# Patient Record
Sex: MALE | Race: WHITE | NOT HISPANIC OR LATINO | Employment: OTHER | URBAN - METROPOLITAN AREA
[De-identification: names, ages, dates, MRNs, and addresses within clinical notes are randomized per-mention and may not be internally consistent; named-entity substitution may affect disease eponyms.]

---

## 2017-03-03 ENCOUNTER — HISTORICAL (OUTPATIENT)
Dept: ADMINISTRATIVE | Facility: HOSPITAL | Age: 62
End: 2017-03-03

## 2018-03-06 ENCOUNTER — HISTORICAL (OUTPATIENT)
Dept: LAB | Facility: HOSPITAL | Age: 63
End: 2018-03-06

## 2018-03-06 LAB
ABS NEUT (OLG): 6.79 X10(3)/MCL (ref 2.1–9.2)
ALBUMIN SERPL-MCNC: 4.3 GM/DL (ref 3.4–5)
ALBUMIN/GLOB SERPL: 1.4 RATIO (ref 1.1–2)
ALP SERPL-CCNC: 83 UNIT/L (ref 50–136)
ALT SERPL-CCNC: 24 UNIT/L (ref 12–78)
APPEARANCE, UA: CLEAR
AST SERPL-CCNC: 18 UNIT/L (ref 15–37)
BACTERIA SPEC CULT: ABNORMAL /HPF
BASOPHILS # BLD AUTO: 0.1 X10(3)/MCL (ref 0–0.2)
BASOPHILS NFR BLD AUTO: 1 %
BILIRUB SERPL-MCNC: 1.1 MG/DL (ref 0.2–1)
BILIRUB UR QL STRIP: NEGATIVE
BILIRUBIN DIRECT+TOT PNL SERPL-MCNC: 0.1 MG/DL (ref 0–0.5)
BILIRUBIN DIRECT+TOT PNL SERPL-MCNC: 1 MG/DL (ref 0–0.8)
BUN SERPL-MCNC: 17 MG/DL (ref 7–18)
CALCIUM SERPL-MCNC: 9.4 MG/DL (ref 8.5–10.1)
CHLORIDE SERPL-SCNC: 100 MMOL/L (ref 98–107)
CHOLEST SERPL-MCNC: 224 MG/DL (ref 0–200)
CHOLEST/HDLC SERPL: 5.3 {RATIO} (ref 0–5)
CO2 SERPL-SCNC: 32 MMOL/L (ref 21–32)
COLOR UR: YELLOW
CREAT SERPL-MCNC: 0.78 MG/DL (ref 0.7–1.3)
EOSINOPHIL # BLD AUTO: 0.2 X10(3)/MCL (ref 0–0.9)
EOSINOPHIL NFR BLD AUTO: 2 %
ERYTHROCYTE [DISTWIDTH] IN BLOOD BY AUTOMATED COUNT: 12.8 % (ref 11.5–17)
EST. AVERAGE GLUCOSE BLD GHB EST-MCNC: 120 MG/DL
GLOBULIN SER-MCNC: 3.1 GM/DL (ref 2.4–3.5)
GLUCOSE (UA): NEGATIVE
GLUCOSE SERPL-MCNC: 101 MG/DL (ref 74–106)
HBA1C MFR BLD: 5.8 % (ref 4.2–6.3)
HCT VFR BLD AUTO: 44.1 % (ref 42–52)
HDLC SERPL-MCNC: 42 MG/DL (ref 35–60)
HGB BLD-MCNC: 14.8 GM/DL (ref 14–18)
HGB UR QL STRIP: NEGATIVE
KETONES UR QL STRIP: NEGATIVE
LDLC SERPL CALC-MCNC: 152 MG/DL (ref 0–129)
LEUKOCYTE ESTERASE UR QL STRIP: NEGATIVE
LYMPHOCYTES # BLD AUTO: 1.6 X10(3)/MCL (ref 0.6–4.6)
LYMPHOCYTES NFR BLD AUTO: 18 %
MCH RBC QN AUTO: 30.5 PG (ref 27–31)
MCHC RBC AUTO-ENTMCNC: 33.6 GM/DL (ref 33–36)
MCV RBC AUTO: 90.7 FL (ref 80–94)
MONOCYTES # BLD AUTO: 0.6 X10(3)/MCL (ref 0.1–1.3)
MONOCYTES NFR BLD AUTO: 6 %
NEUTROPHILS # BLD AUTO: 6.79 X10(3)/MCL (ref 2.1–9.2)
NEUTROPHILS NFR BLD AUTO: 73 %
NITRITE UR QL STRIP: NEGATIVE
PH UR STRIP: 6.5 [PH] (ref 5–9)
PLATELET # BLD AUTO: 297 X10(3)/MCL (ref 130–400)
PMV BLD AUTO: 11.5 FL (ref 9.4–12.4)
POTASSIUM SERPL-SCNC: 3.9 MMOL/L (ref 3.5–5.1)
PROT SERPL-MCNC: 7.4 GM/DL (ref 6.4–8.2)
PROT UR QL STRIP: ABNORMAL
RBC # BLD AUTO: 4.86 X10(6)/MCL (ref 4.7–6.1)
RBC #/AREA URNS HPF: 0 /HPF (ref 0–2)
SODIUM SERPL-SCNC: 137 MMOL/L (ref 136–145)
SP GR UR STRIP: 1.02 (ref 1–1.03)
SQUAMOUS EPITHELIAL, UA: 8 /HPF (ref 0–4)
TRIGL SERPL-MCNC: 150 MG/DL (ref 30–150)
UA WBC MAN: ABNORMAL
UROBILINOGEN UR STRIP-ACNC: 0.2
VLDLC SERPL CALC-MCNC: 30 MG/DL
WBC # SPEC AUTO: 9.3 X10(3)/MCL (ref 4.5–11.5)

## 2018-06-14 ENCOUNTER — HISTORICAL (OUTPATIENT)
Dept: ADMINISTRATIVE | Facility: HOSPITAL | Age: 63
End: 2018-06-14

## 2018-06-20 ENCOUNTER — HISTORICAL (OUTPATIENT)
Dept: LAB | Facility: HOSPITAL | Age: 63
End: 2018-06-20

## 2018-06-20 LAB
ALBUMIN SERPL-MCNC: 4.2 GM/DL (ref 3.4–5)
ALBUMIN/GLOB SERPL: 1.4 RATIO (ref 1.1–2)
ALP SERPL-CCNC: 93 UNIT/L (ref 50–136)
ALT SERPL-CCNC: 37 UNIT/L (ref 12–78)
AST SERPL-CCNC: 24 UNIT/L (ref 15–37)
BILIRUB SERPL-MCNC: 0.4 MG/DL (ref 0.2–1)
BILIRUBIN DIRECT+TOT PNL SERPL-MCNC: 0.1 MG/DL (ref 0–0.5)
BILIRUBIN DIRECT+TOT PNL SERPL-MCNC: 0.3 MG/DL (ref 0–0.8)
BUN SERPL-MCNC: 23 MG/DL (ref 7–18)
CALCIUM SERPL-MCNC: 9.1 MG/DL (ref 8.5–10.1)
CHLORIDE SERPL-SCNC: 100 MMOL/L (ref 98–107)
CHOLEST SERPL-MCNC: 143 MG/DL (ref 0–200)
CHOLEST/HDLC SERPL: 2.9 {RATIO} (ref 0–5)
CO2 SERPL-SCNC: 33 MMOL/L (ref 21–32)
CREAT SERPL-MCNC: 0.82 MG/DL (ref 0.7–1.3)
GLOBULIN SER-MCNC: 2.9 GM/DL (ref 2.4–3.5)
GLUCOSE SERPL-MCNC: 94 MG/DL (ref 74–106)
HDLC SERPL-MCNC: 50 MG/DL (ref 35–60)
LDLC SERPL CALC-MCNC: 73 MG/DL (ref 0–129)
POTASSIUM SERPL-SCNC: 3.6 MMOL/L (ref 3.5–5.1)
PROT SERPL-MCNC: 7.1 GM/DL (ref 6.4–8.2)
SODIUM SERPL-SCNC: 140 MMOL/L (ref 136–145)
TRIGL SERPL-MCNC: 102 MG/DL (ref 30–150)
VLDLC SERPL CALC-MCNC: 20 MG/DL

## 2019-01-08 ENCOUNTER — HISTORICAL (OUTPATIENT)
Dept: LAB | Facility: HOSPITAL | Age: 64
End: 2019-01-08

## 2019-01-08 LAB
ABS NEUT (OLG): 3.74 X10(3)/MCL (ref 2.1–9.2)
ALBUMIN SERPL-MCNC: 4 GM/DL (ref 3.4–5)
ALBUMIN/GLOB SERPL: 1.3 RATIO (ref 1.1–2)
ALP SERPL-CCNC: 106 UNIT/L (ref 50–136)
ALT SERPL-CCNC: 31 UNIT/L (ref 12–78)
APPEARANCE, UA: CLEAR
AST SERPL-CCNC: 27 UNIT/L (ref 15–37)
BACTERIA SPEC CULT: ABNORMAL /HPF
BASOPHILS # BLD AUTO: 0.1 X10(3)/MCL (ref 0–0.2)
BASOPHILS NFR BLD AUTO: 1 %
BILIRUB SERPL-MCNC: 0.6 MG/DL (ref 0.2–1)
BILIRUB UR QL STRIP: NEGATIVE
BILIRUBIN DIRECT+TOT PNL SERPL-MCNC: 0.2 MG/DL (ref 0–0.5)
BILIRUBIN DIRECT+TOT PNL SERPL-MCNC: 0.4 MG/DL (ref 0–0.8)
BUN SERPL-MCNC: 17 MG/DL (ref 7–18)
CALCIUM SERPL-MCNC: 8.7 MG/DL (ref 8.5–10.1)
CHLORIDE SERPL-SCNC: 103 MMOL/L (ref 98–107)
CHOLEST SERPL-MCNC: 156 MG/DL (ref 0–200)
CHOLEST/HDLC SERPL: 2.9 {RATIO} (ref 0–5)
CO2 SERPL-SCNC: 30 MMOL/L (ref 21–32)
COLOR UR: YELLOW
CREAT SERPL-MCNC: 0.78 MG/DL (ref 0.7–1.3)
EOSINOPHIL # BLD AUTO: 0.5 X10(3)/MCL (ref 0–0.9)
EOSINOPHIL NFR BLD AUTO: 8 %
ERYTHROCYTE [DISTWIDTH] IN BLOOD BY AUTOMATED COUNT: 12.9 % (ref 11.5–17)
EST. AVERAGE GLUCOSE BLD GHB EST-MCNC: 143 MG/DL
GLOBULIN SER-MCNC: 3.1 GM/DL (ref 2.4–3.5)
GLUCOSE (UA): NEGATIVE
GLUCOSE SERPL-MCNC: 102 MG/DL (ref 74–106)
HBA1C MFR BLD: 6.6 % (ref 4.2–6.3)
HCT VFR BLD AUTO: 45.2 % (ref 42–52)
HDLC SERPL-MCNC: 53 MG/DL (ref 35–60)
HGB BLD-MCNC: 14.4 GM/DL (ref 14–18)
HGB UR QL STRIP: NEGATIVE
KETONES UR QL STRIP: NEGATIVE
LDLC SERPL CALC-MCNC: 87 MG/DL (ref 0–129)
LEUKOCYTE ESTERASE UR QL STRIP: NEGATIVE
LYMPHOCYTES # BLD AUTO: 1.6 X10(3)/MCL (ref 0.6–4.6)
LYMPHOCYTES NFR BLD AUTO: 24 %
MCH RBC QN AUTO: 29.7 PG (ref 27–31)
MCHC RBC AUTO-ENTMCNC: 31.9 GM/DL (ref 33–36)
MCV RBC AUTO: 93.2 FL (ref 80–94)
MONOCYTES # BLD AUTO: 0.5 X10(3)/MCL (ref 0.1–1.3)
MONOCYTES NFR BLD AUTO: 8 %
NEUTROPHILS # BLD AUTO: 3.74 X10(3)/MCL (ref 2.1–9.2)
NEUTROPHILS NFR BLD AUTO: 58 %
NITRITE UR QL STRIP: NEGATIVE
PH UR STRIP: 7.5 [PH] (ref 5–9)
PLATELET # BLD AUTO: 287 X10(3)/MCL (ref 130–400)
PMV BLD AUTO: 11.7 FL (ref 9.4–12.4)
POTASSIUM SERPL-SCNC: 3.8 MMOL/L (ref 3.5–5.1)
PROT SERPL-MCNC: 7.1 GM/DL (ref 6.4–8.2)
PROT UR QL STRIP: ABNORMAL
RBC # BLD AUTO: 4.85 X10(6)/MCL (ref 4.7–6.1)
RBC #/AREA URNS HPF: ABNORMAL /[HPF]
SODIUM SERPL-SCNC: 139 MMOL/L (ref 136–145)
SP GR UR STRIP: 1.02 (ref 1–1.03)
SQUAMOUS EPITHELIAL, UA: ABNORMAL
TRIGL SERPL-MCNC: 82 MG/DL (ref 30–150)
UROBILINOGEN UR STRIP-ACNC: 0.2
VLDLC SERPL CALC-MCNC: 16 MG/DL
WBC # SPEC AUTO: 6.4 X10(3)/MCL (ref 4.5–11.5)
WBC #/AREA URNS HPF: ABNORMAL /HPF

## 2019-04-16 ENCOUNTER — HISTORICAL (OUTPATIENT)
Dept: LAB | Facility: HOSPITAL | Age: 64
End: 2019-04-16

## 2019-04-16 LAB
ALBUMIN SERPL-MCNC: 4 GM/DL (ref 3.4–5)
ALBUMIN/GLOB SERPL: 1.4 {RATIO}
ALP SERPL-CCNC: 77 UNIT/L (ref 50–136)
ALT SERPL-CCNC: 27 UNIT/L (ref 12–78)
AST SERPL-CCNC: 18 UNIT/L (ref 15–37)
BILIRUB SERPL-MCNC: 0.6 MG/DL (ref 0.2–1)
BILIRUBIN DIRECT+TOT PNL SERPL-MCNC: 0.2 MG/DL (ref 0–0.2)
BILIRUBIN DIRECT+TOT PNL SERPL-MCNC: 0.4 MG/DL (ref 0–0.8)
BUN SERPL-MCNC: 17 MG/DL (ref 7–18)
CALCIUM SERPL-MCNC: 8.8 MG/DL (ref 8.5–10.1)
CHLORIDE SERPL-SCNC: 106 MMOL/L (ref 98–107)
CO2 SERPL-SCNC: 29 MMOL/L (ref 21–32)
CREAT SERPL-MCNC: 0.83 MG/DL (ref 0.7–1.3)
EST. AVERAGE GLUCOSE BLD GHB EST-MCNC: 123 MG/DL
GLOBULIN SER-MCNC: 2.8 GM/DL (ref 2.4–3.5)
GLUCOSE SERPL-MCNC: 100 MG/DL (ref 74–106)
HBA1C MFR BLD: 5.9 % (ref 4.2–6.3)
POTASSIUM SERPL-SCNC: 4 MMOL/L (ref 3.5–5.1)
PROT SERPL-MCNC: 6.8 GM/DL (ref 6.4–8.2)
SODIUM SERPL-SCNC: 140 MMOL/L (ref 136–145)

## 2019-09-11 ENCOUNTER — HISTORICAL (OUTPATIENT)
Dept: RADIOLOGY | Facility: HOSPITAL | Age: 64
End: 2019-09-11

## 2020-02-04 ENCOUNTER — HISTORICAL (OUTPATIENT)
Dept: LAB | Facility: HOSPITAL | Age: 65
End: 2020-02-04

## 2020-02-04 LAB
ABS NEUT (OLG): 4.13 X10(3)/MCL (ref 2.1–9.2)
ALBUMIN SERPL-MCNC: 4 GM/DL (ref 3.4–5)
ALBUMIN/GLOB SERPL: 1.4 {RATIO}
ALP SERPL-CCNC: 87 UNIT/L (ref 50–136)
ALT SERPL-CCNC: 24 UNIT/L (ref 12–78)
AST SERPL-CCNC: 17 UNIT/L (ref 15–37)
BASOPHILS # BLD AUTO: 0.1 X10(3)/MCL (ref 0–0.2)
BASOPHILS NFR BLD AUTO: 1 %
BILIRUB SERPL-MCNC: 0.5 MG/DL (ref 0.2–1)
BILIRUBIN DIRECT+TOT PNL SERPL-MCNC: 0.1 MG/DL (ref 0–0.2)
BILIRUBIN DIRECT+TOT PNL SERPL-MCNC: 0.4 MG/DL (ref 0–0.8)
BUN SERPL-MCNC: 16 MG/DL (ref 7–18)
CALCIUM SERPL-MCNC: 9.5 MG/DL (ref 8.5–10.1)
CHLORIDE SERPL-SCNC: 106 MMOL/L (ref 98–107)
CHOLEST SERPL-MCNC: 127 MG/DL (ref 0–200)
CHOLEST/HDLC SERPL: 2.5 {RATIO} (ref 0–5)
CO2 SERPL-SCNC: 34 MMOL/L (ref 21–32)
CREAT SERPL-MCNC: 0.78 MG/DL (ref 0.7–1.3)
CREAT UR-MCNC: 54 MG/DL
DEPRECATED CALCIDIOL+CALCIFEROL SERPL-MC: 40.56 NG/ML (ref 30–80)
EOSINOPHIL # BLD AUTO: 0.5 X10(3)/MCL (ref 0–0.9)
EOSINOPHIL NFR BLD AUTO: 8 %
ERYTHROCYTE [DISTWIDTH] IN BLOOD BY AUTOMATED COUNT: 13.3 % (ref 11.5–17)
EST. AVERAGE GLUCOSE BLD GHB EST-MCNC: 134 MG/DL
GLOBULIN SER-MCNC: 2.9 GM/DL (ref 2.4–3.5)
GLUCOSE SERPL-MCNC: 101 MG/DL (ref 74–106)
HBA1C MFR BLD: 6.3 % (ref 4.2–6.3)
HCT VFR BLD AUTO: 44.2 % (ref 42–52)
HDLC SERPL-MCNC: 50 MG/DL (ref 35–60)
HGB BLD-MCNC: 13.6 GM/DL (ref 14–18)
LDLC SERPL CALC-MCNC: 63 MG/DL (ref 0–129)
LYMPHOCYTES # BLD AUTO: 1.4 X10(3)/MCL (ref 0.6–4.6)
LYMPHOCYTES NFR BLD AUTO: 21 %
MCH RBC QN AUTO: 29.1 PG (ref 27–31)
MCHC RBC AUTO-ENTMCNC: 30.8 GM/DL (ref 33–36)
MCV RBC AUTO: 94.6 FL (ref 80–94)
MICROALBUMIN UR-MCNC: 0.6 MG/DL
MICROALBUMIN/CREAT RATIO PNL UR: 11.1 MG/GM CR (ref 0–30)
MONOCYTES # BLD AUTO: 0.5 X10(3)/MCL (ref 0.1–1.3)
MONOCYTES NFR BLD AUTO: 8 %
NEUTROPHILS # BLD AUTO: 4.13 X10(3)/MCL (ref 2.1–9.2)
NEUTROPHILS NFR BLD AUTO: 62 %
PLATELET # BLD AUTO: 274 X10(3)/MCL (ref 130–400)
PMV BLD AUTO: 11.7 FL (ref 9.4–12.4)
POTASSIUM SERPL-SCNC: 4.3 MMOL/L (ref 3.5–5.1)
PROT SERPL-MCNC: 6.9 GM/DL (ref 6.4–8.2)
PSA SERPL-MCNC: 0.84 NG/ML (ref 0–4)
RBC # BLD AUTO: 4.67 X10(6)/MCL (ref 4.7–6.1)
SODIUM SERPL-SCNC: 141 MMOL/L (ref 136–145)
TRIGL SERPL-MCNC: 70 MG/DL (ref 30–150)
VLDLC SERPL CALC-MCNC: 14 MG/DL
WBC # SPEC AUTO: 6.6 X10(3)/MCL (ref 4.5–11.5)

## 2020-12-17 ENCOUNTER — HISTORICAL (OUTPATIENT)
Dept: ADMINISTRATIVE | Facility: HOSPITAL | Age: 65
End: 2020-12-17

## 2020-12-29 ENCOUNTER — HISTORICAL (OUTPATIENT)
Dept: ADMINISTRATIVE | Facility: HOSPITAL | Age: 65
End: 2020-12-29

## 2021-01-28 ENCOUNTER — HISTORICAL (OUTPATIENT)
Dept: ADMINISTRATIVE | Facility: HOSPITAL | Age: 66
End: 2021-01-28

## 2021-01-28 LAB
ABS NEUT (OLG): 4.59 X10(3)/MCL (ref 2.1–9.2)
ALBUMIN SERPL-MCNC: 4.6 GM/DL (ref 3.4–4.8)
ALBUMIN/GLOB SERPL: 1.8 RATIO (ref 1.1–2)
ALP SERPL-CCNC: 92 UNIT/L (ref 40–150)
ALT SERPL-CCNC: 20 UNIT/L (ref 0–55)
AST SERPL-CCNC: 24 UNIT/L (ref 5–34)
BASOPHILS # BLD AUTO: 0 X10(3)/MCL (ref 0–0.2)
BASOPHILS NFR BLD AUTO: 1 %
BILIRUB SERPL-MCNC: 0.6 MG/DL
BILIRUBIN DIRECT+TOT PNL SERPL-MCNC: 0.3 MG/DL (ref 0–0.5)
BILIRUBIN DIRECT+TOT PNL SERPL-MCNC: 0.3 MG/DL (ref 0–0.8)
BUN SERPL-MCNC: 21.5 MG/DL (ref 8.4–25.7)
CALCIUM SERPL-MCNC: 9.7 MG/DL (ref 8.8–10)
CHLORIDE SERPL-SCNC: 99 MMOL/L (ref 98–107)
CHOLEST SERPL-MCNC: 134 MG/DL
CHOLEST/HDLC SERPL: 3 {RATIO} (ref 0–5)
CO2 SERPL-SCNC: 34 MMOL/L (ref 23–31)
CREAT SERPL-MCNC: 0.76 MG/DL (ref 0.73–1.18)
CREAT UR-MCNC: 114.1 MG/DL (ref 58–161)
EOSINOPHIL # BLD AUTO: 0.5 X10(3)/MCL (ref 0–0.9)
EOSINOPHIL NFR BLD AUTO: 7 %
ERYTHROCYTE [DISTWIDTH] IN BLOOD BY AUTOMATED COUNT: 13.7 % (ref 11.5–17)
EST. AVERAGE GLUCOSE BLD GHB EST-MCNC: 114 MG/DL
GLOBULIN SER-MCNC: 2.5 GM/DL (ref 2.4–3.5)
GLUCOSE SERPL-MCNC: 91 MG/DL (ref 82–115)
HBA1C MFR BLD: 5.6 %
HCT VFR BLD AUTO: 43.5 % (ref 42–52)
HDLC SERPL-MCNC: 42 MG/DL (ref 35–60)
HGB BLD-MCNC: 13.6 GM/DL (ref 14–18)
LDLC SERPL CALC-MCNC: 73 MG/DL (ref 50–140)
LYMPHOCYTES # BLD AUTO: 1.4 X10(3)/MCL (ref 0.6–4.6)
LYMPHOCYTES NFR BLD AUTO: 20 %
MCH RBC QN AUTO: 29.8 PG (ref 27–31)
MCHC RBC AUTO-ENTMCNC: 31.3 GM/DL (ref 33–36)
MCV RBC AUTO: 95.2 FL (ref 80–94)
MICROALBUMIN UR-MCNC: 11.6 UG/ML
MICROALBUMIN/CREAT RATIO PNL UR: 10.2 MG/GM CR (ref 0–30)
MONOCYTES # BLD AUTO: 0.5 X10(3)/MCL (ref 0.1–1.3)
MONOCYTES NFR BLD AUTO: 8 %
NEUTROPHILS # BLD AUTO: 4.59 X10(3)/MCL (ref 2.1–9.2)
NEUTROPHILS NFR BLD AUTO: 65 %
PLATELET # BLD AUTO: 342 X10(3)/MCL (ref 130–400)
PMV BLD AUTO: 11.8 FL (ref 9.4–12.4)
POTASSIUM SERPL-SCNC: 4.7 MMOL/L (ref 3.5–5.1)
PROT SERPL-MCNC: 7.1 GM/DL (ref 5.8–7.6)
PSA SERPL-MCNC: 1.22 NG/ML
RBC # BLD AUTO: 4.57 X10(6)/MCL (ref 4.7–6.1)
SODIUM SERPL-SCNC: 140 MMOL/L (ref 136–145)
TRIGL SERPL-MCNC: 93 MG/DL (ref 34–140)
TSH SERPL-ACNC: 1.45 UIU/ML (ref 0.35–4.94)
VLDLC SERPL CALC-MCNC: 19 MG/DL
WBC # SPEC AUTO: 7.1 X10(3)/MCL (ref 4.5–11.5)

## 2022-04-01 ENCOUNTER — HISTORICAL (OUTPATIENT)
Dept: ADMINISTRATIVE | Facility: HOSPITAL | Age: 67
End: 2022-04-01

## 2022-04-01 LAB
ABS NEUT (OLG): 5.12 (ref 2.1–9.2)
ALBUMIN SERPL-MCNC: 4.6 G/DL (ref 3.4–4.8)
ALBUMIN/GLOB SERPL: 1.6 {RATIO} (ref 1.1–2)
ALP SERPL-CCNC: 92 U/L (ref 40–150)
ALT SERPL-CCNC: 19 U/L (ref 0–55)
AST SERPL-CCNC: 25 U/L (ref 5–34)
BASOPHILS # BLD AUTO: 0.1 10*3/UL (ref 0–0.2)
BASOPHILS NFR BLD AUTO: 1 %
BILIRUB SERPL-MCNC: 0.6 MG/DL
BILIRUBIN DIRECT+TOT PNL SERPL-MCNC: 0.2 (ref 0–0.5)
BILIRUBIN DIRECT+TOT PNL SERPL-MCNC: 0.4 (ref 0–0.8)
BUN SERPL-MCNC: 16.6 MG/DL (ref 8.4–25.7)
CALCIUM SERPL-MCNC: 9.7 MG/DL (ref 8.7–10.5)
CHLORIDE SERPL-SCNC: 102 MMOL/L (ref 98–107)
CHOLEST SERPL-MCNC: 132 MG/DL
CHOLEST/HDLC SERPL: 3 {RATIO} (ref 0–5)
CO2 SERPL-SCNC: 27 MMOL/L (ref 23–31)
CREAT SERPL-MCNC: 0.76 MG/DL (ref 0.73–1.18)
CREAT UR-MCNC: 188.6 MG/DL (ref 58–161)
EOSINOPHIL # BLD AUTO: 0.6 10*3/UL (ref 0–0.9)
EOSINOPHIL NFR BLD AUTO: 8 %
ERYTHROCYTE [DISTWIDTH] IN BLOOD BY AUTOMATED COUNT: 13.8 % (ref 11.5–17)
EST. AVERAGE GLUCOSE BLD GHB EST-MCNC: 111.2 MG/DL
GLOBULIN SER-MCNC: 2.8 G/DL (ref 2.4–3.5)
GLUCOSE SERPL-MCNC: 90 MG/DL (ref 82–115)
HBA1C MFR BLD: 5.5 %
HCT VFR BLD AUTO: 42.5 % (ref 42–52)
HDLC SERPL-MCNC: 45 MG/DL (ref 35–60)
HEMOLYSIS INTERF INDEX SERPL-ACNC: 6
HGB BLD-MCNC: 13.7 G/DL (ref 14–18)
ICTERIC INTERF INDEX SERPL-ACNC: 1
LDLC SERPL CALC-MCNC: 68 MG/DL (ref 50–140)
LIPEMIC INTERF INDEX SERPL-ACNC: <0
LYMPHOCYTES # BLD AUTO: 1.4 10*3/UL (ref 0.6–4.6)
LYMPHOCYTES NFR BLD AUTO: 18 %
MANUAL DIFF? (OHS): NO
MCH RBC QN AUTO: 29.7 PG (ref 27–31)
MCHC RBC AUTO-ENTMCNC: 32.2 G/DL (ref 33–36)
MCV RBC AUTO: 92 FL (ref 80–94)
MICROALBUMIN UR-MCNC: 15
MICROALBUMIN/CREAT RATIO PNL UR: 8 (ref 0–30)
MONOCYTES # BLD AUTO: 0.6 10*3/UL (ref 0.1–1.3)
MONOCYTES NFR BLD AUTO: 8 %
NEUTROPHILS # BLD AUTO: 5.12 10*3/UL (ref 2.1–9.2)
NEUTROPHILS NFR BLD AUTO: 65 %
PLATELET # BLD AUTO: 341 10*3/UL (ref 130–400)
PMV BLD AUTO: 11.2 FL (ref 9.4–12.4)
POTASSIUM SERPL-SCNC: 4 MMOL/L (ref 3.5–5.1)
PROT SERPL-MCNC: 7.4 G/DL (ref 5.8–7.6)
PSA SERPL-MCNC: 1.25 NG/ML
RBC # BLD AUTO: 4.62 10*6/UL (ref 4.7–6.1)
SODIUM SERPL-SCNC: 138 MMOL/L (ref 136–145)
TRIGL SERPL-MCNC: 95 MG/DL (ref 34–140)
TSH SERPL-ACNC: 0.92 M[IU]/L (ref 0.35–4.94)
VLDLC SERPL CALC-MCNC: 19 MG/DL
WBC # SPEC AUTO: 7.8 10*3/UL (ref 4.5–11.5)

## 2022-05-23 RX ORDER — AMLODIPINE BESYLATE 5 MG/1
5 TABLET ORAL DAILY
COMMUNITY
Start: 2022-02-17 | End: 2022-05-23 | Stop reason: SDUPTHER

## 2022-05-23 RX ORDER — AMLODIPINE BESYLATE 5 MG/1
5 TABLET ORAL DAILY
Qty: 90 TABLET | Refills: 1 | Status: SHIPPED | OUTPATIENT
Start: 2022-05-23 | End: 2022-11-21 | Stop reason: SDUPTHER

## 2022-05-24 ENCOUNTER — TELEPHONE (OUTPATIENT)
Dept: PRIMARY CARE CLINIC | Facility: CLINIC | Age: 67
End: 2022-05-24
Payer: MEDICARE

## 2022-05-24 NOTE — TELEPHONE ENCOUNTER
Last Pneumonia immunization pt received was pneumococcal polysaccharide. I instructed patient that he does not need another pneumonia immunization

## 2022-05-24 NOTE — TELEPHONE ENCOUNTER
----- Message from Peter Archibald sent at 5/24/2022  2:19 PM CDT -----  Regarding: Immunization Records  Type:  Patient Returning Call    Who Called: pt   Who Left Message for Patient: Nurse   Does the patient know what this is regarding?: yes   Would the patient rather a call back or a response via [facility]? Yes   Best Call Back Number: [phone]  Additional Information: wants to know what the name of his last pneumonia shot was and does it need to be updated

## 2022-06-15 DIAGNOSIS — I10 HYPERTENSION, UNSPECIFIED TYPE: Primary | ICD-10-CM

## 2022-06-15 RX ORDER — HYDROCHLOROTHIAZIDE 25 MG/1
25 TABLET ORAL DAILY
COMMUNITY
Start: 2022-03-08 | End: 2022-06-15 | Stop reason: SDUPTHER

## 2022-06-15 NOTE — TELEPHONE ENCOUNTER
----- Message from Carmela Correa LPN sent at 6/6/2022  9:17 AM CDT -----  Regarding: FW: call back    ----- Message -----  From: Juana Ceballos Patient Care Assistant  Sent: 6/6/2022   9:15 AM CDT  To: Miguel Angel Helms Staff  Subject: call back                                        Type:  Patient Returning Call    Who Called: pt   Who Left Message for Patient:for nurse  Does the patient know what this is regarding?: canceled script  Would the patient rather a call back or a response via [facility]? C/b  Best Call Back Number: [phone]  Additional Information: Hydrochlorothiazide was discontinued by   and when pt went to fill his script he was told this med was discontinued back in April. Could we call pt back to discuss why or if you still need him on this med to send refill to his pharmacy please.

## 2022-06-16 RX ORDER — HYDROCHLOROTHIAZIDE 25 MG/1
25 TABLET ORAL DAILY
Qty: 90 TABLET | Refills: 3 | Status: SHIPPED | OUTPATIENT
Start: 2022-06-16 | End: 2023-06-12 | Stop reason: SDUPTHER

## 2022-06-20 RX ORDER — ATORVASTATIN CALCIUM 40 MG/1
40 TABLET, FILM COATED ORAL DAILY
Qty: 90 TABLET | Refills: 3 | Status: SHIPPED | OUTPATIENT
Start: 2022-06-20 | End: 2023-07-21 | Stop reason: SDUPTHER

## 2022-06-20 RX ORDER — ATORVASTATIN CALCIUM 40 MG/1
40 TABLET, FILM COATED ORAL DAILY
COMMUNITY
Start: 2022-03-01 | End: 2022-06-20 | Stop reason: SDUPTHER

## 2022-08-29 RX ORDER — METFORMIN HYDROCHLORIDE 1000 MG/1
1000 TABLET ORAL 2 TIMES DAILY
Qty: 60 TABLET | Refills: 3 | Status: SHIPPED | OUTPATIENT
Start: 2022-08-29 | End: 2022-12-29 | Stop reason: SDUPTHER

## 2022-08-29 RX ORDER — METFORMIN HYDROCHLORIDE 1000 MG/1
1000 TABLET ORAL 2 TIMES DAILY
COMMUNITY
Start: 2022-05-17 | End: 2022-08-29 | Stop reason: SDUPTHER

## 2022-10-02 PROBLEM — E11.9 DIABETES MELLITUS: Status: ACTIVE | Noted: 2022-10-02

## 2022-10-02 PROBLEM — D64.9 ANEMIA: Chronic | Status: ACTIVE | Noted: 2022-10-02

## 2022-10-02 PROBLEM — E78.2 MIXED HYPERLIPIDEMIA: Status: ACTIVE | Noted: 2022-10-02

## 2022-10-02 PROBLEM — E66.9 OBESITY: Chronic | Status: ACTIVE | Noted: 2022-10-02

## 2022-10-02 PROBLEM — E66.9 OBESITY: Status: ACTIVE | Noted: 2022-10-02

## 2022-10-02 PROBLEM — E78.2 MIXED HYPERLIPIDEMIA: Status: RESOLVED | Noted: 2022-10-02 | Resolved: 2022-10-02

## 2022-10-02 PROBLEM — I10 PRIMARY HYPERTENSION: Chronic | Status: ACTIVE | Noted: 2022-10-02

## 2022-10-02 PROBLEM — M75.111 NONTRAUMATIC INCOMPLETE TEAR OF RIGHT ROTATOR CUFF: Status: ACTIVE | Noted: 2022-10-02

## 2022-10-02 PROBLEM — D64.9 ANEMIA: Status: ACTIVE | Noted: 2022-10-02

## 2022-10-02 PROBLEM — E78.5 DYSLIPIDEMIA: Chronic | Status: ACTIVE | Noted: 2022-10-02

## 2022-10-02 PROBLEM — M54.81 CERVICO-OCCIPITAL NEURALGIA: Status: ACTIVE | Noted: 2022-10-02

## 2022-10-02 PROBLEM — S46.219A RUPTURE OF BICEPS TENDON: Status: ACTIVE | Noted: 2022-10-02

## 2022-10-02 PROBLEM — G47.52 REM SLEEP BEHAVIOR DISORDER: Chronic | Status: ACTIVE | Noted: 2022-10-02

## 2022-10-02 PROBLEM — G47.52 REM SLEEP BEHAVIOR DISORDER: Status: ACTIVE | Noted: 2022-10-02

## 2022-10-02 PROBLEM — E11.9 TYPE 2 DIABETES MELLITUS WITHOUT COMPLICATION, WITHOUT LONG-TERM CURRENT USE OF INSULIN: Chronic | Status: ACTIVE | Noted: 2022-10-02

## 2022-10-02 PROBLEM — I10 HYPERTENSION: Status: ACTIVE | Noted: 2022-10-02

## 2022-10-03 ENCOUNTER — OFFICE VISIT (OUTPATIENT)
Dept: PRIMARY CARE CLINIC | Facility: CLINIC | Age: 67
End: 2022-10-03
Payer: MEDICARE

## 2022-10-03 VITALS
WEIGHT: 179.81 LBS | HEIGHT: 66 IN | RESPIRATION RATE: 16 BRPM | BODY MASS INDEX: 28.9 KG/M2 | SYSTOLIC BLOOD PRESSURE: 127 MMHG | HEART RATE: 68 BPM | DIASTOLIC BLOOD PRESSURE: 78 MMHG | OXYGEN SATURATION: 97 %

## 2022-10-03 DIAGNOSIS — I10 PRIMARY HYPERTENSION: Chronic | ICD-10-CM

## 2022-10-03 DIAGNOSIS — Z00.00 WELLNESS EXAMINATION: Primary | ICD-10-CM

## 2022-10-03 DIAGNOSIS — D64.9 ANEMIA, UNSPECIFIED TYPE: Chronic | ICD-10-CM

## 2022-10-03 DIAGNOSIS — E11.9 TYPE 2 DIABETES MELLITUS WITHOUT COMPLICATION, WITHOUT LONG-TERM CURRENT USE OF INSULIN: Chronic | ICD-10-CM

## 2022-10-03 DIAGNOSIS — F41.1 GAD (GENERALIZED ANXIETY DISORDER): ICD-10-CM

## 2022-10-03 DIAGNOSIS — E78.5 DYSLIPIDEMIA: Chronic | ICD-10-CM

## 2022-10-03 DIAGNOSIS — E66.9 OBESITY, UNSPECIFIED CLASSIFICATION, UNSPECIFIED OBESITY TYPE, UNSPECIFIED WHETHER SERIOUS COMORBIDITY PRESENT: Chronic | ICD-10-CM

## 2022-10-03 DIAGNOSIS — Z12.5 SCREENING FOR PROSTATE CANCER: ICD-10-CM

## 2022-10-03 PROCEDURE — 3061F NEG MICROALBUMINURIA REV: CPT | Mod: CPTII,,, | Performed by: GENERAL PRACTICE

## 2022-10-03 PROCEDURE — 3078F DIAST BP <80 MM HG: CPT | Mod: CPTII,,, | Performed by: GENERAL PRACTICE

## 2022-10-03 PROCEDURE — 1101F PR PT FALLS ASSESS DOC 0-1 FALLS W/OUT INJ PAST YR: ICD-10-PCS | Mod: CPTII,,, | Performed by: GENERAL PRACTICE

## 2022-10-03 PROCEDURE — 1160F PR REVIEW ALL MEDS BY PRESCRIBER/CLIN PHARMACIST DOCUMENTED: ICD-10-PCS | Mod: CPTII,,, | Performed by: GENERAL PRACTICE

## 2022-10-03 PROCEDURE — 3008F BODY MASS INDEX DOCD: CPT | Mod: CPTII,,, | Performed by: GENERAL PRACTICE

## 2022-10-03 PROCEDURE — 3288F PR FALLS RISK ASSESSMENT DOCUMENTED: ICD-10-PCS | Mod: CPTII,,, | Performed by: GENERAL PRACTICE

## 2022-10-03 PROCEDURE — 1160F RVW MEDS BY RX/DR IN RCRD: CPT | Mod: CPTII,,, | Performed by: GENERAL PRACTICE

## 2022-10-03 PROCEDURE — 3074F PR MOST RECENT SYSTOLIC BLOOD PRESSURE < 130 MM HG: ICD-10-PCS | Mod: CPTII,,, | Performed by: GENERAL PRACTICE

## 2022-10-03 PROCEDURE — 1159F MED LIST DOCD IN RCRD: CPT | Mod: CPTII,,, | Performed by: GENERAL PRACTICE

## 2022-10-03 PROCEDURE — 3078F PR MOST RECENT DIASTOLIC BLOOD PRESSURE < 80 MM HG: ICD-10-PCS | Mod: CPTII,,, | Performed by: GENERAL PRACTICE

## 2022-10-03 PROCEDURE — 99213 OFFICE O/P EST LOW 20 MIN: CPT | Mod: ,,, | Performed by: GENERAL PRACTICE

## 2022-10-03 PROCEDURE — 1159F PR MEDICATION LIST DOCUMENTED IN MEDICAL RECORD: ICD-10-PCS | Mod: CPTII,,, | Performed by: GENERAL PRACTICE

## 2022-10-03 PROCEDURE — 1101F PT FALLS ASSESS-DOCD LE1/YR: CPT | Mod: CPTII,,, | Performed by: GENERAL PRACTICE

## 2022-10-03 PROCEDURE — 3066F NEPHROPATHY DOC TX: CPT | Mod: CPTII,,, | Performed by: GENERAL PRACTICE

## 2022-10-03 PROCEDURE — 3061F PR NEG MICROALBUMINURIA RESULT DOCUMENTED/REVIEW: ICD-10-PCS | Mod: CPTII,,, | Performed by: GENERAL PRACTICE

## 2022-10-03 PROCEDURE — 3074F SYST BP LT 130 MM HG: CPT | Mod: CPTII,,, | Performed by: GENERAL PRACTICE

## 2022-10-03 PROCEDURE — 3066F PR DOCUMENTATION OF TREATMENT FOR NEPHROPATHY: ICD-10-PCS | Mod: CPTII,,, | Performed by: GENERAL PRACTICE

## 2022-10-03 PROCEDURE — 99213 PR OFFICE/OUTPT VISIT, EST, LEVL III, 20-29 MIN: ICD-10-PCS | Mod: ,,, | Performed by: GENERAL PRACTICE

## 2022-10-03 PROCEDURE — 3008F PR BODY MASS INDEX (BMI) DOCUMENTED: ICD-10-PCS | Mod: CPTII,,, | Performed by: GENERAL PRACTICE

## 2022-10-03 PROCEDURE — 3288F FALL RISK ASSESSMENT DOCD: CPT | Mod: CPTII,,, | Performed by: GENERAL PRACTICE

## 2022-10-03 RX ORDER — CLONAZEPAM 1 MG/1
1 TABLET ORAL NIGHTLY
COMMUNITY
Start: 2022-07-19 | End: 2022-10-21

## 2022-10-03 RX ORDER — SERTRALINE HYDROCHLORIDE 100 MG/1
100 TABLET, FILM COATED ORAL DAILY
Qty: 90 TABLET | Refills: 5 | Status: SHIPPED | OUTPATIENT
Start: 2022-10-03 | End: 2023-11-02 | Stop reason: SDUPTHER

## 2022-10-03 RX ORDER — SERTRALINE HYDROCHLORIDE 100 MG/1
100 TABLET, FILM COATED ORAL DAILY
COMMUNITY
Start: 2022-06-17 | End: 2022-10-03 | Stop reason: SDUPTHER

## 2022-10-03 NOTE — ASSESSMENT & PLAN NOTE
Weight loss, healthy dietary choices, increased activity/exercise are recommended.  To lose weight, it is generally recommended to restrict calories to approximately 1500 calories per day to lose 1 lb per week, and approximately 1200 calories per day to lose up to 2 lb per week.  Generally, this is a healthy amount of weight to lose, and an appropriate amount of time to lose this amount of weight.  Adding exercise will assist in losing weight, particularly aerobic exercise such as walking.  Keep track of BMI (body mass index), below 25 is considered normal, over 25 but below 30 is considered overweight, anything over 30 is considered moderately obese, over 35 severely obese, and over 40 is characterized as morbidly obese.

## 2022-10-03 NOTE — PROGRESS NOTES
"Subjective:       Patient ID: Pito Garcia is a 67 y.o. male.    Chief Complaint: No chief complaint on file.    Patient presents to the clinic today for chronic condition follow-up.  Doing well, no specific complaints, tolerating all medications, reporting no significant side effects or problems.    Last wellness exam was 04/01/2022.    Review of Systems   Constitutional: Negative.  Negative for fatigue and fever.   HENT: Negative.  Negative for sore throat and trouble swallowing.    Eyes: Negative.  Negative for redness and visual disturbance.   Respiratory: Negative.  Negative for chest tightness and shortness of breath.    Cardiovascular: Negative.  Negative for chest pain.   Gastrointestinal: Negative.  Negative for abdominal pain, blood in stool and nausea.   Endocrine: Negative.  Negative for cold intolerance, heat intolerance and polyuria.   Genitourinary: Negative.    Musculoskeletal: Negative.  Negative for arthralgias, gait problem and joint swelling.   Integumentary:  Negative for rash. Negative.   Neurological: Negative.  Negative for dizziness, weakness and headaches.   Psychiatric/Behavioral: Negative.  Negative for agitation and dysphoric mood.        Objective:     Vitals:    10/03/22 0935   BP: 127/78   BP Location: Right arm   Patient Position: Sitting   BP Method: Large (Automatic)   Pulse: 68   Resp: 16   SpO2: 97%   Weight: 81.6 kg (179 lb 12.8 oz)   Height: 5' 6" (1.676 m)   Body mass index is 29.02 kg/m².     Physical Exam  Constitutional:       Appearance: Normal appearance.   Eyes:      Conjunctiva/sclera: Conjunctivae normal.   Cardiovascular:      Rate and Rhythm: Normal rate and regular rhythm.   Pulmonary:      Effort: Pulmonary effort is normal.      Breath sounds: Normal breath sounds.   Skin:     General: Skin is warm and dry.   Neurological:      Mental Status: He is alert.   Psychiatric:         Mood and Affect: Mood normal.         Behavior: Behavior normal.         Lab " Results   Component Value Date     04/01/2022    K 4.0 04/01/2022    CO2 27 04/01/2022    BUN 16.6 04/01/2022    CREATININE 0.76 04/01/2022    CALCIUM 9.7 04/01/2022    ALBUMIN 4.6 04/01/2022    BILITOT 0.6 04/01/2022    ALKPHOS 92 04/01/2022    AST 25 04/01/2022    ALT 19 04/01/2022    EGFRNONAA >60 04/01/2022     Lab Results   Component Value Date    WBC 7.8 04/01/2022    RBC 4.62 04/01/2022    HGB 13.7 04/01/2022    HCT 42.5 04/01/2022    MCV 92.0 04/01/2022    RDW 13.8 04/01/2022     04/01/2022      Lab Results   Component Value Date    CHOL 132 04/01/2022    TRIG 95 04/01/2022    HDL 45 04/01/2022    LDL 68.00 04/01/2022    TOTALCHOLEST 3 04/01/2022     Lab Results   Component Value Date    TSH 0.9163 04/01/2022     Lab Results   Component Value Date    HGBA1C 5.5 04/01/2022        Lab Results   Component Value Date    PSA 1.25 04/01/2022     No components found for: VITAMIND    Assessment:     Problem List Items Addressed This Visit          Cardiac/Vascular    Primary hypertension (Chronic)    Current Assessment & Plan     Blood pressures appear to be adequately controlled with current treatment plan, continue medical management and continue home blood pressure checks.  Blood pressure should be checked as discussed during medical visits, and average blood pressure should be no greater than 130/80.         Relevant Orders    Hemoglobin A1C    TSH    Lipid Panel    Comprehensive Metabolic Panel    CBC Auto Differential    PSA, Screening    Microalbumin/Creatinine Ratio, Urine    Dyslipidemia (Chronic)    Current Assessment & Plan     Cholesterol/lipid blood testing shows adequate control, continue current treatment plan, including prescription medications if prescribed, and/or diet high in fiber, low in saturated fats as discussed during clinic visit.         Relevant Orders    Lipid Panel       Oncology    Anemia (Chronic)    Current Assessment & Plan     Very mild stable anemia, we are monitoring  this annually.         Relevant Orders    CBC Auto Differential       Endocrine    Obesity (Chronic)    Current Assessment & Plan     Weight loss, healthy dietary choices, increased activity/exercise are recommended.  To lose weight, it is generally recommended to restrict calories to approximately 1500 calories per day to lose 1 lb per week, and approximately 1200 calories per day to lose up to 2 lb per week.  Generally, this is a healthy amount of weight to lose, and an appropriate amount of time to lose this amount of weight.  Adding exercise will assist in losing weight, particularly aerobic exercise such as walking.  Keep track of BMI (body mass index), below 25 is considered normal, over 25 but below 30 is considered overweight, anything over 30 is considered moderately obese, over 35 severely obese, and over 40 is characterized as morbidly obese.         Type 2 diabetes mellitus without complication, without long-term current use of insulin (Chronic)    Current Assessment & Plan     Current A1c looks good, shows control, continue medical management.         Relevant Orders    Hemoglobin A1C     Other Visit Diagnoses       Wellness examination    -  Primary    Wellness exam with pre visit labs scheduled for 6 months.    Relevant Orders    Hemoglobin A1C    TSH    Lipid Panel    Comprehensive Metabolic Panel    CBC Auto Differential    PSA, Screening    Hepatitis C Antibody    Microalbumin/Creatinine Ratio, Urine    Screening for prostate cancer        Relevant Orders    PSA, Screening               Medication List with Changes/Refills   Current Medications    AMLODIPINE (NORVASC) 5 MG TABLET    Take 1 tablet (5 mg total) by mouth once daily.    ATORVASTATIN (LIPITOR) 40 MG TABLET    Take 1 tablet (40 mg total) by mouth once daily.    CLONAZEPAM (KLONOPIN) 1 MG TABLET    Take 1 mg by mouth every evening.    HYDROCHLOROTHIAZIDE (HYDRODIURIL) 25 MG TABLET    Take 1 tablet (25 mg total) by mouth once daily.     METFORMIN (GLUCOPHAGE) 1000 MG TABLET    Take 1 tablet (1,000 mg total) by mouth 2 (two) times daily.   Changed and/or Refilled Medications    Modified Medication Previous Medication    SERTRALINE (ZOLOFT) 100 MG TABLET sertraline (ZOLOFT) 100 MG tablet       Take 1 tablet (100 mg total) by mouth once daily.    Take 100 mg by mouth once daily.     Plan:             Follow up in about 6 months (around 4/3/2023) for Wellness.

## 2022-10-03 NOTE — ASSESSMENT & PLAN NOTE
Started on sertraline several years ago by Dr. Lenz, his previous PCP for some shortness of breath and probable anxiety, initially on 50 mg, now on 100 mg, needs a refill of the medication.  Seems to be doing well.

## 2022-10-21 ENCOUNTER — TELEPHONE (OUTPATIENT)
Dept: PRIMARY CARE CLINIC | Facility: CLINIC | Age: 67
End: 2022-10-21
Payer: MEDICARE

## 2022-10-21 NOTE — TELEPHONE ENCOUNTER
----- Message from Deirdre Duran sent at 10/21/2022  8:12 AM CDT -----  Regarding: Meds  .Type:  RX Refill Request    Who Called: Pt  Refill or New Rx:Refill  RX Name and Strength:clonazePAM (KLONOPIN) 1 MG tablet  How is the patient currently taking it? (ex. 1XDay):1xday  Is this a 30 day or 90 day RX:  Preferred Pharmacy with phone number:  Local or Mail Order:Local  Ordering Provider:Miguel Angel  Would the patient rather a call back or a response via [facility]? Call back  Best Call Back Number:[phone]  Additional Information: Pt gets meds from Dr Josemanuel Felipe but is having trouble getting it at this moment and needs some to hold him for the a couple of days..

## 2022-10-27 LAB
LEFT EYE DM RETINOPATHY: NEGATIVE
RIGHT EYE DM RETINOPATHY: NEGATIVE

## 2022-11-21 ENCOUNTER — TELEPHONE (OUTPATIENT)
Dept: PRIMARY CARE CLINIC | Facility: CLINIC | Age: 67
End: 2022-11-21
Payer: MEDICARE

## 2022-11-21 DIAGNOSIS — I10 PRIMARY HYPERTENSION: Primary | Chronic | ICD-10-CM

## 2022-11-21 RX ORDER — AMLODIPINE BESYLATE 5 MG/1
5 TABLET ORAL DAILY
Qty: 90 TABLET | Refills: 3 | Status: SHIPPED | OUTPATIENT
Start: 2022-11-21 | End: 2023-10-23 | Stop reason: SDUPTHER

## 2022-11-21 NOTE — TELEPHONE ENCOUNTER
----- Message from Lew Zuniga sent at 11/21/2022  9:26 AM CST -----  .Type:  RX Refill Request    Who Called: Pito   Refill or New Rx: Refill   RX Name and Strength:amLODIPinemLODIPine (NORVASC) 5 MG tablet  How is the patient currently taking it? (ex. 1XDay):  Is this a 30 day or 90 day RX:  Preferred Pharmacy with phone number: [facility] in [place]  Local or Mail Order: Local   Ordering Provider: Miguel Angel  Would the patient rather a call back or a response via [facility]?   Best Call Back Number: [phone]  Additional Information: He is completely out and needs to get it filled today.

## 2022-12-13 ENCOUNTER — PATIENT MESSAGE (OUTPATIENT)
Dept: RESEARCH | Facility: HOSPITAL | Age: 67
End: 2022-12-13
Payer: MEDICARE

## 2022-12-28 ENCOUNTER — OFFICE VISIT (OUTPATIENT)
Dept: NEUROLOGY | Facility: CLINIC | Age: 67
End: 2022-12-28
Payer: MEDICARE

## 2022-12-28 VITALS
SYSTOLIC BLOOD PRESSURE: 110 MMHG | WEIGHT: 180 LBS | BODY MASS INDEX: 28.93 KG/M2 | DIASTOLIC BLOOD PRESSURE: 68 MMHG | HEIGHT: 66 IN

## 2022-12-28 DIAGNOSIS — G47.52 REM SLEEP BEHAVIOR DISORDER: Primary | Chronic | ICD-10-CM

## 2022-12-28 PROCEDURE — 99214 PR OFFICE/OUTPT VISIT, EST, LEVL IV, 30-39 MIN: ICD-10-PCS | Mod: S$GLB,,, | Performed by: PSYCHIATRY & NEUROLOGY

## 2022-12-28 PROCEDURE — 99999 PR PBB SHADOW E&M-EST. PATIENT-LVL III: CPT | Mod: PBBFAC,,, | Performed by: PSYCHIATRY & NEUROLOGY

## 2022-12-28 PROCEDURE — 1101F PT FALLS ASSESS-DOCD LE1/YR: CPT | Mod: CPTII,S$GLB,, | Performed by: PSYCHIATRY & NEUROLOGY

## 2022-12-28 PROCEDURE — 3078F PR MOST RECENT DIASTOLIC BLOOD PRESSURE < 80 MM HG: ICD-10-PCS | Mod: CPTII,S$GLB,, | Performed by: PSYCHIATRY & NEUROLOGY

## 2022-12-28 PROCEDURE — 3074F SYST BP LT 130 MM HG: CPT | Mod: CPTII,S$GLB,, | Performed by: PSYCHIATRY & NEUROLOGY

## 2022-12-28 PROCEDURE — 1159F PR MEDICATION LIST DOCUMENTED IN MEDICAL RECORD: ICD-10-PCS | Mod: CPTII,S$GLB,, | Performed by: PSYCHIATRY & NEUROLOGY

## 2022-12-28 PROCEDURE — 3074F PR MOST RECENT SYSTOLIC BLOOD PRESSURE < 130 MM HG: ICD-10-PCS | Mod: CPTII,S$GLB,, | Performed by: PSYCHIATRY & NEUROLOGY

## 2022-12-28 PROCEDURE — 1101F PR PT FALLS ASSESS DOC 0-1 FALLS W/OUT INJ PAST YR: ICD-10-PCS | Mod: CPTII,S$GLB,, | Performed by: PSYCHIATRY & NEUROLOGY

## 2022-12-28 PROCEDURE — 99999 PR PBB SHADOW E&M-EST. PATIENT-LVL III: ICD-10-PCS | Mod: PBBFAC,,, | Performed by: PSYCHIATRY & NEUROLOGY

## 2022-12-28 PROCEDURE — 3061F PR NEG MICROALBUMINURIA RESULT DOCUMENTED/REVIEW: ICD-10-PCS | Mod: CPTII,S$GLB,, | Performed by: PSYCHIATRY & NEUROLOGY

## 2022-12-28 PROCEDURE — 3066F PR DOCUMENTATION OF TREATMENT FOR NEPHROPATHY: ICD-10-PCS | Mod: CPTII,S$GLB,, | Performed by: PSYCHIATRY & NEUROLOGY

## 2022-12-28 PROCEDURE — 1159F MED LIST DOCD IN RCRD: CPT | Mod: CPTII,S$GLB,, | Performed by: PSYCHIATRY & NEUROLOGY

## 2022-12-28 PROCEDURE — 99214 OFFICE O/P EST MOD 30 MIN: CPT | Mod: S$GLB,,, | Performed by: PSYCHIATRY & NEUROLOGY

## 2022-12-28 PROCEDURE — 3288F FALL RISK ASSESSMENT DOCD: CPT | Mod: CPTII,S$GLB,, | Performed by: PSYCHIATRY & NEUROLOGY

## 2022-12-28 PROCEDURE — 3066F NEPHROPATHY DOC TX: CPT | Mod: CPTII,S$GLB,, | Performed by: PSYCHIATRY & NEUROLOGY

## 2022-12-28 PROCEDURE — 3008F PR BODY MASS INDEX (BMI) DOCUMENTED: ICD-10-PCS | Mod: CPTII,S$GLB,, | Performed by: PSYCHIATRY & NEUROLOGY

## 2022-12-28 PROCEDURE — 3288F PR FALLS RISK ASSESSMENT DOCUMENTED: ICD-10-PCS | Mod: CPTII,S$GLB,, | Performed by: PSYCHIATRY & NEUROLOGY

## 2022-12-28 PROCEDURE — 1125F PR PAIN SEVERITY QUANTIFIED, PAIN PRESENT: ICD-10-PCS | Mod: CPTII,S$GLB,, | Performed by: PSYCHIATRY & NEUROLOGY

## 2022-12-28 PROCEDURE — 3061F NEG MICROALBUMINURIA REV: CPT | Mod: CPTII,S$GLB,, | Performed by: PSYCHIATRY & NEUROLOGY

## 2022-12-28 PROCEDURE — 3008F BODY MASS INDEX DOCD: CPT | Mod: CPTII,S$GLB,, | Performed by: PSYCHIATRY & NEUROLOGY

## 2022-12-28 PROCEDURE — 1125F AMNT PAIN NOTED PAIN PRSNT: CPT | Mod: CPTII,S$GLB,, | Performed by: PSYCHIATRY & NEUROLOGY

## 2022-12-28 PROCEDURE — 3078F DIAST BP <80 MM HG: CPT | Mod: CPTII,S$GLB,, | Performed by: PSYCHIATRY & NEUROLOGY

## 2022-12-28 RX ORDER — CLONAZEPAM 1 MG/1
1 TABLET ORAL NIGHTLY
Qty: 90 TABLET | Refills: 1 | Status: SHIPPED | OUTPATIENT
Start: 2022-12-28 | End: 2023-06-28 | Stop reason: SDUPTHER

## 2022-12-28 RX ORDER — CALCIUM CARBONATE/VITAMIN D3 500-10/5ML
1 LIQUID (ML) ORAL DAILY
COMMUNITY

## 2022-12-28 RX ORDER — OMEGA-3 FATTY ACIDS 1000 MG
1 CAPSULE ORAL DAILY
COMMUNITY

## 2022-12-28 RX ORDER — UBIDECARENONE 30 MG
30 CAPSULE ORAL DAILY
COMMUNITY

## 2022-12-28 RX ORDER — HYDROGEN PEROXIDE 3 %
20 SOLUTION, NON-ORAL MISCELLANEOUS
COMMUNITY
End: 2023-04-05

## 2022-12-28 NOTE — PROGRESS NOTES
Subjective:       Patient ID: Pito Garcia is a 67 y.o. male.    Chief Complaint: Sleep Apnea (Last tested 2015 - not using CPAP 2nd to oral dryness)    HPI  RBD x 2 years  We dont have his original sleep study  Some purposeful movements and 'a lot of flopping around' during sleep  Takes clonazepam; helps less than it did initailly  Also takes melatonin  Has been on zoloft x 2-3 years; he does not feel there is a correlation to this med and the events  No tremors/hallucinations/parkinsonism  Does not tolerate cpap 2.2 oral dryness  Review of Systems    The remainder of the 14 system ROS is noncontributory or negative unless mentioned/reviewed above.    Objective:      Physical Exam  Mental Status: Alert and oriented x3. Language is fluent with good comprehension.    Cranial Nerve: Pupils are equal, round, and reactive to light. Visual fields are intact to confrontation. Normal fundi. Ocular movements are intact. Face is symmetric at rest and with activation with intact sensation throughout. Hearing intact to finger rub bilaterally. Muscles of tongue and palate activate symmetrically. No dysarthria. Strength is full in sternocleidomastoid and trapezius bilaterally.    Motor: Muscle bulk and tone are normal. Strength is 5/5 in all four extremities both proximally and distally. Intact fine motor movements bilaterally. There is no pronator drift or satelliting on arm roll.    Sensory: Sensation is intact to light touch, pinprick, vibration, and proprioception throughout. Romberg is negative.    Reflexes: 2+ and symmetric at the biceps, triceps, brachioradialis, patella, and Achilles bilaterally. Plantar response is flexor bilaterally.    Coordination: No dysmetria on finger-nose-finger or heel-knee-shin. Normal rapid alternating movements. Fast finger tapping with normal amplitude and speed.    Gait: Narrow based with normal stride length and good arm swing bilaterally. Able to walk on heels, toes, and in  tandem.    No parkinsonism  Assessment:       Problem List Items Addressed This Visit    None      Plan:       Continue clonazepam  Try reducing zoloft to 50 mg/day

## 2022-12-29 DIAGNOSIS — E11.9 TYPE 2 DIABETES MELLITUS WITHOUT COMPLICATION, WITHOUT LONG-TERM CURRENT USE OF INSULIN: Primary | ICD-10-CM

## 2022-12-29 RX ORDER — METFORMIN HYDROCHLORIDE 1000 MG/1
1000 TABLET ORAL 2 TIMES DAILY
Qty: 60 TABLET | Refills: 3 | Status: SHIPPED | OUTPATIENT
Start: 2022-12-29 | End: 2023-04-04 | Stop reason: SDUPTHER

## 2023-02-21 LAB — CRC RECOMMENDATION EXT: NORMAL

## 2023-02-22 ENCOUNTER — DOCUMENTATION ONLY (OUTPATIENT)
Dept: PRIMARY CARE CLINIC | Facility: CLINIC | Age: 68
End: 2023-02-22
Payer: MEDICARE

## 2023-04-03 ENCOUNTER — CLINICAL SUPPORT (OUTPATIENT)
Dept: PRIMARY CARE CLINIC | Facility: CLINIC | Age: 68
End: 2023-04-03
Payer: MEDICARE

## 2023-04-03 DIAGNOSIS — Z00.00 WELLNESS EXAMINATION: ICD-10-CM

## 2023-04-03 DIAGNOSIS — E78.5 DYSLIPIDEMIA: Chronic | ICD-10-CM

## 2023-04-03 DIAGNOSIS — D64.9 ANEMIA, UNSPECIFIED TYPE: ICD-10-CM

## 2023-04-03 DIAGNOSIS — E11.9 TYPE 2 DIABETES MELLITUS WITHOUT COMPLICATION, WITHOUT LONG-TERM CURRENT USE OF INSULIN: Chronic | ICD-10-CM

## 2023-04-03 DIAGNOSIS — I10 PRIMARY HYPERTENSION: ICD-10-CM

## 2023-04-03 DIAGNOSIS — Z00.00 ENCOUNTER FOR WELLNESS EXAMINATION: Primary | ICD-10-CM

## 2023-04-03 DIAGNOSIS — Z12.5 SCREENING FOR PROSTATE CANCER: ICD-10-CM

## 2023-04-03 LAB
ALBUMIN SERPL-MCNC: 4.3 G/DL (ref 3.4–4.8)
ALBUMIN/GLOB SERPL: 1.7 RATIO (ref 1.1–2)
ALP SERPL-CCNC: 79 UNIT/L (ref 40–150)
ALT SERPL-CCNC: 16 UNIT/L (ref 0–55)
AST SERPL-CCNC: 20 UNIT/L (ref 5–34)
BASOPHILS # BLD AUTO: 0.05 X10(3)/MCL (ref 0–0.2)
BASOPHILS NFR BLD AUTO: 0.7 %
BILIRUBIN DIRECT+TOT PNL SERPL-MCNC: 0.4 MG/DL
BUN SERPL-MCNC: 18.5 MG/DL (ref 8.4–25.7)
CALCIUM SERPL-MCNC: 9.7 MG/DL (ref 8.8–10)
CHLORIDE SERPL-SCNC: 102 MMOL/L (ref 98–107)
CHOLEST SERPL-MCNC: 139 MG/DL
CHOLEST/HDLC SERPL: 3 {RATIO} (ref 0–5)
CO2 SERPL-SCNC: 32 MMOL/L (ref 23–31)
CREAT SERPL-MCNC: 0.77 MG/DL (ref 0.73–1.18)
CREAT UR-MCNC: 55 MG/DL (ref 63–166)
EOSINOPHIL # BLD AUTO: 0.66 X10(3)/MCL (ref 0–0.9)
EOSINOPHIL NFR BLD AUTO: 9.5 %
ERYTHROCYTE [DISTWIDTH] IN BLOOD BY AUTOMATED COUNT: 13.8 % (ref 11.5–17)
EST. AVERAGE GLUCOSE BLD GHB EST-MCNC: 128.4 MG/DL
GFR SERPLBLD CREATININE-BSD FMLA CKD-EPI: >60 MLS/MIN/1.73/M2
GLOBULIN SER-MCNC: 2.5 GM/DL (ref 2.4–3.5)
GLUCOSE SERPL-MCNC: 104 MG/DL (ref 82–115)
HBA1C MFR BLD: 6.1 %
HCT VFR BLD AUTO: 39 % (ref 42–52)
HDLC SERPL-MCNC: 51 MG/DL (ref 35–60)
HGB BLD-MCNC: 12.5 G/DL (ref 14–18)
IMM GRANULOCYTES # BLD AUTO: 0.01 X10(3)/MCL (ref 0–0.04)
IMM GRANULOCYTES NFR BLD AUTO: 0.1 %
LDLC SERPL CALC-MCNC: 70 MG/DL (ref 50–140)
LYMPHOCYTES # BLD AUTO: 1.44 X10(3)/MCL (ref 0.6–4.6)
LYMPHOCYTES NFR BLD AUTO: 20.6 %
MCH RBC QN AUTO: 28.9 PG (ref 27–31)
MCHC RBC AUTO-ENTMCNC: 32.1 G/DL (ref 33–36)
MCV RBC AUTO: 90.1 FL (ref 80–94)
MICROALBUMIN UR-MCNC: <5 UG/ML
MICROALBUMIN/CREAT RATIO PNL UR: <9.1 MG/GM CR (ref 0–30)
MONOCYTES # BLD AUTO: 0.47 X10(3)/MCL (ref 0.1–1.3)
MONOCYTES NFR BLD AUTO: 6.7 %
NEUTROPHILS # BLD AUTO: 4.35 X10(3)/MCL (ref 2.1–9.2)
NEUTROPHILS NFR BLD AUTO: 62.4 %
NRBC BLD AUTO-RTO: 0 %
PLATELET # BLD AUTO: 329 X10(3)/MCL (ref 130–400)
PMV BLD AUTO: 11.4 FL (ref 7.4–10.4)
POTASSIUM SERPL-SCNC: 4.4 MMOL/L (ref 3.5–5.1)
PROT SERPL-MCNC: 6.8 GM/DL (ref 5.8–7.6)
PSA SERPL-MCNC: 1.69 NG/ML
RBC # BLD AUTO: 4.33 X10(6)/MCL (ref 4.7–6.1)
SODIUM SERPL-SCNC: 142 MMOL/L (ref 136–145)
TRIGL SERPL-MCNC: 88 MG/DL (ref 34–140)
TSH SERPL-ACNC: 1.29 UIU/ML (ref 0.35–4.94)
VLDLC SERPL CALC-MCNC: 18 MG/DL
WBC # SPEC AUTO: 7 X10(3)/MCL (ref 4.5–11.5)

## 2023-04-03 PROCEDURE — 80061 LIPID PANEL: CPT | Performed by: GENERAL PRACTICE

## 2023-04-03 PROCEDURE — 36415 PR COLLECTION VENOUS BLOOD,VENIPUNCTURE: ICD-10-PCS | Mod: ,,, | Performed by: GENERAL PRACTICE

## 2023-04-03 PROCEDURE — 82043 UR ALBUMIN QUANTITATIVE: CPT | Performed by: GENERAL PRACTICE

## 2023-04-03 PROCEDURE — 36415 COLL VENOUS BLD VENIPUNCTURE: CPT

## 2023-04-03 PROCEDURE — 83036 HEMOGLOBIN GLYCOSYLATED A1C: CPT | Performed by: GENERAL PRACTICE

## 2023-04-03 PROCEDURE — 84153 ASSAY OF PSA TOTAL: CPT | Performed by: GENERAL PRACTICE

## 2023-04-03 PROCEDURE — 85025 COMPLETE CBC W/AUTO DIFF WBC: CPT | Performed by: GENERAL PRACTICE

## 2023-04-03 PROCEDURE — 80053 COMPREHEN METABOLIC PANEL: CPT | Performed by: GENERAL PRACTICE

## 2023-04-03 PROCEDURE — 36415 COLL VENOUS BLD VENIPUNCTURE: CPT | Mod: ,,, | Performed by: GENERAL PRACTICE

## 2023-04-03 PROCEDURE — 86803 HEPATITIS C AB TEST: CPT | Performed by: GENERAL PRACTICE

## 2023-04-03 PROCEDURE — 84443 ASSAY THYROID STIM HORMONE: CPT | Performed by: GENERAL PRACTICE

## 2023-04-03 NOTE — PROGRESS NOTES
Patient here to draw Wellness labs with 22  gauge needle. Patient was drawn in right arm .No complications or issues occurred. Patient expressed no pain.

## 2023-04-04 DIAGNOSIS — E11.9 TYPE 2 DIABETES MELLITUS WITHOUT COMPLICATION, WITHOUT LONG-TERM CURRENT USE OF INSULIN: ICD-10-CM

## 2023-04-04 PROBLEM — G89.29 CHRONIC LOW BACK PAIN: Status: ACTIVE | Noted: 2023-04-04

## 2023-04-04 PROBLEM — M54.50 CHRONIC LOW BACK PAIN: Status: ACTIVE | Noted: 2023-04-04

## 2023-04-04 LAB — HCV AB SERPL QL IA: NONREACTIVE

## 2023-04-04 RX ORDER — METFORMIN HYDROCHLORIDE 1000 MG/1
1000 TABLET ORAL 2 TIMES DAILY
Qty: 180 TABLET | Refills: 3 | Status: SHIPPED | OUTPATIENT
Start: 2023-04-04 | End: 2024-04-03

## 2023-04-05 ENCOUNTER — OFFICE VISIT (OUTPATIENT)
Dept: PRIMARY CARE CLINIC | Facility: CLINIC | Age: 68
End: 2023-04-05
Payer: MEDICARE

## 2023-04-05 VITALS
HEART RATE: 64 BPM | WEIGHT: 176 LBS | DIASTOLIC BLOOD PRESSURE: 80 MMHG | RESPIRATION RATE: 18 BRPM | BODY MASS INDEX: 28.28 KG/M2 | HEIGHT: 66 IN | SYSTOLIC BLOOD PRESSURE: 136 MMHG | OXYGEN SATURATION: 98 %

## 2023-04-05 DIAGNOSIS — E66.3 OVERWEIGHT (BMI 25.0-29.9): Chronic | ICD-10-CM

## 2023-04-05 DIAGNOSIS — E11.9 TYPE 2 DIABETES MELLITUS WITHOUT COMPLICATION, WITHOUT LONG-TERM CURRENT USE OF INSULIN: Chronic | ICD-10-CM

## 2023-04-05 DIAGNOSIS — Z00.00 MEDICARE ANNUAL WELLNESS VISIT, SUBSEQUENT: Primary | ICD-10-CM

## 2023-04-05 DIAGNOSIS — Z12.5 SCREENING FOR PROSTATE CANCER: ICD-10-CM

## 2023-04-05 DIAGNOSIS — I10 PRIMARY HYPERTENSION: Chronic | ICD-10-CM

## 2023-04-05 DIAGNOSIS — D64.9 ANEMIA, UNSPECIFIED TYPE: Chronic | ICD-10-CM

## 2023-04-05 DIAGNOSIS — F41.1 GAD (GENERALIZED ANXIETY DISORDER): Chronic | ICD-10-CM

## 2023-04-05 DIAGNOSIS — E78.5 DYSLIPIDEMIA: Chronic | ICD-10-CM

## 2023-04-05 PROBLEM — E66.9 OBESITY: Chronic | Status: RESOLVED | Noted: 2022-10-02 | Resolved: 2023-04-05

## 2023-04-05 PROCEDURE — 3061F PR NEG MICROALBUMINURIA RESULT DOCUMENTED/REVIEW: ICD-10-PCS | Mod: CPTII,,, | Performed by: GENERAL PRACTICE

## 2023-04-05 PROCEDURE — 4010F PR ACE/ARB THEARPY RXD/TAKEN: ICD-10-PCS | Mod: CPTII,,, | Performed by: GENERAL PRACTICE

## 2023-04-05 PROCEDURE — 3008F PR BODY MASS INDEX (BMI) DOCUMENTED: ICD-10-PCS | Mod: CPTII,,, | Performed by: GENERAL PRACTICE

## 2023-04-05 PROCEDURE — G0439 PPPS, SUBSEQ VISIT: HCPCS | Mod: ,,, | Performed by: GENERAL PRACTICE

## 2023-04-05 PROCEDURE — 3079F PR MOST RECENT DIASTOLIC BLOOD PRESSURE 80-89 MM HG: ICD-10-PCS | Mod: CPTII,,, | Performed by: GENERAL PRACTICE

## 2023-04-05 PROCEDURE — 1101F PR PT FALLS ASSESS DOC 0-1 FALLS W/OUT INJ PAST YR: ICD-10-PCS | Mod: CPTII,,, | Performed by: GENERAL PRACTICE

## 2023-04-05 PROCEDURE — 4010F ACE/ARB THERAPY RXD/TAKEN: CPT | Mod: CPTII,,, | Performed by: GENERAL PRACTICE

## 2023-04-05 PROCEDURE — 3288F PR FALLS RISK ASSESSMENT DOCUMENTED: ICD-10-PCS | Mod: CPTII,,, | Performed by: GENERAL PRACTICE

## 2023-04-05 PROCEDURE — 1101F PT FALLS ASSESS-DOCD LE1/YR: CPT | Mod: CPTII,,, | Performed by: GENERAL PRACTICE

## 2023-04-05 PROCEDURE — 1160F PR REVIEW ALL MEDS BY PRESCRIBER/CLIN PHARMACIST DOCUMENTED: ICD-10-PCS | Mod: CPTII,,, | Performed by: GENERAL PRACTICE

## 2023-04-05 PROCEDURE — 3066F PR DOCUMENTATION OF TREATMENT FOR NEPHROPATHY: ICD-10-PCS | Mod: CPTII,,, | Performed by: GENERAL PRACTICE

## 2023-04-05 PROCEDURE — 1159F MED LIST DOCD IN RCRD: CPT | Mod: CPTII,,, | Performed by: GENERAL PRACTICE

## 2023-04-05 PROCEDURE — 3066F NEPHROPATHY DOC TX: CPT | Mod: CPTII,,, | Performed by: GENERAL PRACTICE

## 2023-04-05 PROCEDURE — 1160F RVW MEDS BY RX/DR IN RCRD: CPT | Mod: CPTII,,, | Performed by: GENERAL PRACTICE

## 2023-04-05 PROCEDURE — 1159F PR MEDICATION LIST DOCUMENTED IN MEDICAL RECORD: ICD-10-PCS | Mod: CPTII,,, | Performed by: GENERAL PRACTICE

## 2023-04-05 PROCEDURE — 3061F NEG MICROALBUMINURIA REV: CPT | Mod: CPTII,,, | Performed by: GENERAL PRACTICE

## 2023-04-05 PROCEDURE — 3075F PR MOST RECENT SYSTOLIC BLOOD PRESS GE 130-139MM HG: ICD-10-PCS | Mod: CPTII,,, | Performed by: GENERAL PRACTICE

## 2023-04-05 PROCEDURE — 3008F BODY MASS INDEX DOCD: CPT | Mod: CPTII,,, | Performed by: GENERAL PRACTICE

## 2023-04-05 PROCEDURE — G0439 PR MEDICARE ANNUAL WELLNESS SUBSEQUENT VISIT: ICD-10-PCS | Mod: ,,, | Performed by: GENERAL PRACTICE

## 2023-04-05 PROCEDURE — 3288F FALL RISK ASSESSMENT DOCD: CPT | Mod: CPTII,,, | Performed by: GENERAL PRACTICE

## 2023-04-05 PROCEDURE — 3075F SYST BP GE 130 - 139MM HG: CPT | Mod: CPTII,,, | Performed by: GENERAL PRACTICE

## 2023-04-05 PROCEDURE — 3079F DIAST BP 80-89 MM HG: CPT | Mod: CPTII,,, | Performed by: GENERAL PRACTICE

## 2023-04-05 RX ORDER — OMEPRAZOLE 20 MG/1
20 CAPSULE, DELAYED RELEASE ORAL
COMMUNITY

## 2023-04-05 RX ORDER — LISINOPRIL 10 MG/1
10 TABLET ORAL
COMMUNITY

## 2023-04-05 NOTE — ASSESSMENT & PLAN NOTE
Prescribed atorvastatin 40 mg daily.  Most recent cholesterol/lipid blood testing shows adequate control, continue current treatment plan, including prescription medications if prescribed, and/or diet high in fiber, low in saturated fats as discussed during clinic visit.    Component Ref Range & Units 1 d ago  (4/3/23) 1 yr ago  (4/1/22) 2 yr ago  (1/28/21) 3 yr ago  (2/4/20) 4 yr ago  (1/8/19) 4 yr ago  (6/20/18) 5 yr ago  (3/6/18)   Cholesterol Total <=200 mg/dL 139  132 R  134 R  127 R  156 R  143 R  224 High  R    HDL Cholesterol 35 - 60 mg/dL 51  45 R  42  50  53  50  42    Triglyceride 34 - 140 mg/dL 88  95 R  93  70 R  82 R  102 R  150 R    Cholesterol/HDL Ratio 0 - 5 3  3  3  2.5 R  2.9 R  2.9 R  5.3 High  R    Very Low Density Lipoprotein  18  19  19  14 R  16 R  20 R  30 R    LDL Cholesterol 50.00 - 140.00 mg/dL 70.00  68.00 R  73.00  63 R  87 R  73 R  152 High  R

## 2023-04-05 NOTE — ASSESSMENT & PLAN NOTE
Current A1c 6.1%.  Prescribed metformin 1000 mg b.i.d..  Most recent hemoglobin A1c shows good control on diabetic treatment plan, including diabetic prescription medication.  Continue current treatment plan, medical regimen, and lifestyle modification as discussed at this visit and during previous visits.    Component Ref Range & Units 1 d ago  (4/3/23) 1 yr ago  (4/1/22) 2 yr ago  (1/28/21) 3 yr ago  (2/4/20) 3 yr ago  (4/16/19) 4 yr ago  (1/8/19) 5 yr ago  (3/6/18)   Hemoglobin A1c <=7.0 % 6.1  5.5 R  5.6 R  6.3 R  5.9 R  6.6 High  R  5.8 R    Estimated Average Glucose mg/dL 128.4  111.2 R  114.0  134  123  143  120

## 2023-04-05 NOTE — ASSESSMENT & PLAN NOTE
Component Ref Range & Units 1 d ago  (4/3/23) 1 yr ago  (4/1/22) 1 yr ago  (4/1/22) 2 yr ago  (1/28/21) 2 yr ago  (1/28/21) 3 yr ago  (2/4/20) 3 yr ago  (2/4/20)   WBC 4.5 - 11.5 x10(3)/mcL 7.0  7.8 R   7.1   6.6     RBC 4.70 - 6.10 x10(6)/mcL 4.33 Low   4.62 R   4.57 Low    4.67 Low      Hgb 14.0 - 18.0 g/dL 12.5 Low   13.7 R   13.6 Low  R   13.6 Low  R     Hct 42.0 - 52.0 % 39.0 Low   42.5 R   43.5   44.2     MCV 80.0 - 94.0 fL 90.1  92.0 R   95.2 High    94.6 High        Anemia mild, fairly stable, we will continue to monitor.

## 2023-04-05 NOTE — PROGRESS NOTES
Patient ID: [id number]     Chief Complaint: Annual Exam      HPI:     Pito Garcia is a 67 y.o. male here today for a Medicare Wellness. No other complaints today.       ----------------------------  Anemia  Diabetes mellitus, type 2  Hypercholesterolemia  Hypertension     Past Surgical History:   Procedure Laterality Date    COLONOSCOPY      Dr High    HEMANGIOMA EXCISION      TONSILLECTOMY      VASECTOMY         Review of patient's allergies indicates:   Allergen Reactions    Hydrocodone Nausea And Vomiting     Other reaction(s): emesis    Hydrocodone-ibuprofen Nausea Only       Outpatient Medications Marked as Taking for the 4/5/23 encounter (Office Visit) with Scooter Michelle MD   Medication Sig Dispense Refill    amLODIPine (NORVASC) 5 MG tablet Take 1 tablet (5 mg total) by mouth once daily. 90 tablet 3    atorvastatin (LIPITOR) 40 MG tablet Take 1 tablet (40 mg total) by mouth once daily. 90 tablet 3    calcium carbonate (CALCIUM 600 ORAL) Take 1 tablet by mouth Daily.      clonazePAM (KLONOPIN) 1 MG tablet Take 1 tablet (1 mg total) by mouth every evening. 90 tablet 1    co-enzyme Q-10 30 mg capsule Take 30 mg by mouth once daily.      hydroCHLOROthiazide (HYDRODIURIL) 25 MG tablet Take 1 tablet (25 mg total) by mouth once daily. 90 tablet 3    lisinopriL 10 MG tablet Take 10 mg by mouth.      magnesium oxide 400 mg magnesium Cap Take 1 tablet by mouth Daily.      MEN'S MULTI-VITAMIN ORAL Take 1 tablet by mouth Daily.      metFORMIN (GLUCOPHAGE) 1000 MG tablet Take 1 tablet (1,000 mg total) by mouth 2 (two) times daily. 180 tablet 3    omega-3 fatty acids 1,000 mg Cap Take 1 capsule by mouth Daily.      omeprazole (PRILOSEC) 20 MG capsule Take 20 mg by mouth.      sertraline (ZOLOFT) 100 MG tablet Take 1 tablet (100 mg total) by mouth once daily. 90 tablet 5       Social History     Socioeconomic History    Marital status:    Tobacco Use    Smoking status: Former     Types:  Cigarettes    Smokeless tobacco: Never   Substance and Sexual Activity    Alcohol use: Yes     Comment: occasionally    Drug use: Not Currently        Family History   Problem Relation Age of Onset    Diabetes Mother     Stroke Mother         Patient Care Team:  Scooter Michelle MD as PCP - General (Family Medicine)     Opioid Screening: Patient medication list reviewed, patient is not taking prescription opioids. Patient is not using additional opioids than prescribed. Patient is at low risk of substance abuse based on this opioid use history.       Subjective:     Review of Systems   Constitutional: Negative.  Negative for malaise/fatigue and weight loss.   HENT: Negative.     Eyes: Negative.    Respiratory: Negative.  Negative for shortness of breath.    Cardiovascular: Negative.  Negative for chest pain.   Gastrointestinal: Negative.    Genitourinary: Negative.    Musculoskeletal: Negative.    Skin: Negative.    Neurological: Negative.  Negative for focal weakness, weakness and headaches.   Endo/Heme/Allergies: Negative.    Psychiatric/Behavioral: Negative.  Negative for depression and memory loss. The patient is not nervous/anxious.        Patient Reported Health Risk Assessment  What [age] your [age]?: 65-69  Are you male or female?: Male  During the past four weeks, how much have you been bothered by emotional problems such as feeling anxious, depressed, irritable, sad, or downhearted and blue?: Not at all  During the past five weeks, has your physical and/or emotional health limited your social activities with family, friends, neighbors, or groups?: Not at all  During the past four weeks, how much bodily pain have you generally had?: No pain  During the past four weeks, was someone available to help if you needed and wanted help?: Yes, as much as I wanted  During the past four weeks, what was the hardest physical activity you could do for at least two minutes?: Very light  Can you get to places out of walking  distance without help?  (For example, can you travel alone on buses or taxis, or drive your own car?): Yes  Can you go shopping for groceries or clothes without someone's help?: Yes  Can you prepare your own meals?: Yes  Can you do your own housework without help?: Yes  Because of any health problems, do you need the help of another person with your personal care needs such as eating, bathing, dressing, or getting around the house?: No  Can you handle your own money without help?: Yes  During the past four weeks, how would you rate your health in general?: Good  How have things been going for you during the past four weeks?: Pretty well  Are you having difficulties driving your car?: No  Do you always fasten your seat belt when you are in a car?: Yes, usually  How often in the past four weeks have you been bothered by falling or dizzy when standing up?: Never  How often in the past four weeks have you been bothered by sexual problems?: Never  How often in the past four weeks have you been bothered by trouble eating well?: Never  How often in the past four weeks have you been bothered by teeth or denture problems?: Never  How often in the past four weeks have you been bothered with problems using the telephone?: Never  How often in the past four weeks have you been bothered by tiredness or fatigue?: Never  Have you fallen two or more times in the past year?: No  Are you afraid of falling?: No  Are you a smoker?: No  During the past four weeks, how many drinks of wine, beer, or other alcoholic beverages did you have?: No alcohol at all  Do you exercise for about 20 minutes three or more days a week?: No, I usually do not exercise this much  Have you been given any information to help you with hazards in your house that might hurt you?: No  Have you been given any information to help you with keeping track of your medications?: No  How often do you have trouble taking medicines the way you've been told to take them?: I  "always take them as prescribed  How confident are you that you can control and manage most of your health problems?: Very confident  What is your race? (Check all that apply.):     Objective:     /80   Pulse 64   Resp 18   Ht 5' 6" (1.676 m)   Wt 79.8 kg (176 lb)   SpO2 98%   BMI 28.41 kg/m²     Physical Exam  Constitutional:       Appearance: Normal appearance.   HENT:      Head: Normocephalic.      Mouth/Throat:      Pharynx: Oropharynx is clear.   Eyes:      Conjunctiva/sclera: Conjunctivae normal.      Pupils: Pupils are equal, round, and reactive to light.   Cardiovascular:      Rate and Rhythm: Normal rate and regular rhythm.      Heart sounds: Normal heart sounds.   Pulmonary:      Effort: Pulmonary effort is normal.      Breath sounds: Normal breath sounds.   Abdominal:      General: Abdomen is flat.      Palpations: Abdomen is soft.   Musculoskeletal:         General: Normal range of motion.      Cervical back: Neck supple.   Skin:     General: Skin is warm and dry.   Neurological:      General: No focal deficit present.      Mental Status: He is oriented to person, place, and time.   Psychiatric:         Mood and Affect: Mood normal.         Behavior: Behavior normal.         Thought Content: Thought content normal.         Judgment: Judgment normal.   Diabetic foot exam:  Examination performed by myself, Dr. Michelle    Examination of the skin/nails:  Bilateral examination of the feet reveals no significant skin lesions/abnormalities, ulcerations, ulcers, nail abnormalities.  Patient does appear to be using appropriate footwear.    Peripheral pulses:  Dorsalis pedis pulse-normal bilaterally  Posterior tibialis pulse-normal bilaterally  Appropriate amount of foot hair, capillary refill normal    Five point sensation test:  Left foot sensation test-no significant loss of sensation at all points tested  Right foot to cessation test-no significant loss of sensation at all points " tested    Foot exam risk category:  0-2 (none or minimal loss of protective sensation with no additional findings)    Foot exam intervention:  Foot care education done at appropriate risk level      Lab Results   Component Value Date     04/03/2023    K 4.4 04/03/2023    CO2 32 (H) 04/03/2023    BUN 18.5 04/03/2023    CREATININE 0.77 04/03/2023    CALCIUM 9.7 04/03/2023    ALBUMIN 4.3 04/03/2023    BILITOT 0.4 04/03/2023    ALKPHOS 79 04/03/2023    AST 20 04/03/2023    ALT 16 04/03/2023    EGFRNONAA >60 04/01/2022     Lab Results   Component Value Date    WBC 7.0 04/03/2023    RBC 4.33 (L) 04/03/2023    HGB 12.5 (L) 04/03/2023    HCT 39.0 (L) 04/03/2023    MCV 90.1 04/03/2023    RDW 13.8 04/03/2023     04/03/2023      Lab Results   Component Value Date    CHOL 139 04/03/2023    TRIG 88 04/03/2023    HDL 51 04/03/2023    LDL 70.00 04/03/2023    TOTALCHOLEST 3 04/03/2023     Lab Results   Component Value Date    TSH 1.294 04/03/2023     Lab Results   Component Value Date    HGBA1C 6.1 04/03/2023        Lab Results   Component Value Date    PSA 1.69 04/03/2023         No flowsheet data found.  Fall Risk Assessment - Outpatient 4/5/2023 12/28/2022 10/3/2022   Mobility Status Ambulatory Ambulatory Ambulatory   Number of falls 0 1 0   Identified as fall risk 0 0 0           Depression Screening  Over the past two weeks, has the patient felt down, depressed, or hopeless?: No  Over the past two weeks, has the patient felt little interest or pleasure in doing things?: No  Functional Ability/Safety Screening  Was the patient's timed Up & Go test unsteady or longer than 30 seconds?: No  Does the patient need help with phone, transportation, shopping, preparing meals, housework, laundry, meds, or managing money?: No  Does the patient's home have rugs in the hallway, lack grab bars in the bathroom, lack handrails on the stairs or have poor lighting?: No  Have you noticed any hearing difficulties?: No  Cognitive  Function (Assessed through direct observation with due consideration of information obtained by way of patient reports and/or concerns raised by family, friends, caretakers, or others)    Does the patient repeat questions/statements in the same day?: No  Does the patient have trouble remembering the date, year, and time?: No  Does the patient have difficulty managing finances?: No  Does the patient have a decreased sense of direction?: No  Assessment:       ICD-10-CM ICD-9-CM   1. Medicare annual wellness visit, subsequent  Z00.00 V70.0   2. Screening for prostate cancer  Z12.5 V76.44   3. Primary hypertension  I10 401.9   4. Type 2 diabetes mellitus without complication, without long-term current use of insulin  E11.9 250.00   5. Dyslipidemia  E78.5 272.4   6. JOE (generalized anxiety disorder)  F41.1 300.02   7. Anemia, unspecified type  D64.9 285.9   8. Overweight (BMI 25.0-29.9)  E66.3 278.02        Plan:     Medicare Annual Wellness and Personalized Prevention Plan:   Fall Risk + Home Safety + Hearing Impairment + Depression Screen + Cognitive Impairment Screen + Health Risk Assessment all reviewed.       Health Maintenance Topics with due status: Not Due       Topic Last Completion Date    Colorectal Cancer Screening 02/21/2023    Diabetes Urine Screening 04/03/2023    Lipid Panel 04/03/2023    Hemoglobin A1c 04/03/2023    Low Dose Statin 04/05/2023    Foot Exam 04/05/2023      The patient's Health Maintenance was reviewed and the following appears to be due at this time:   Health Maintenance Due   Topic Date Due    Abdominal Aortic Aneurysm Screening  Never done    Eye Exam  10/30/2021         1. Medicare annual wellness visit, subsequent  Comments:  Overall health status was reviewed.  Good health habits reinforced.  Annual wellness exams recommended.    2. Screening for prostate cancer  Comments:  Prostate specific antigen blood test obtained was essentially normal, suggesting that there is no current  concern for prostate cancer.  Digital exam deferred.    3. Primary hypertension  Assessment & Plan:  Prescribed amlodipine 5 mg daily, hydrochlorothiazide 25 mg daily.  Blood pressures appear to be adequately controlled with current treatment plan, including prescription blood pressure medication. Continue medical management and continue home blood pressure checks.  Blood pressure should be checked as discussed during medical visits, and average blood pressure should be no greater than 130/80.      4. Type 2 diabetes mellitus without complication, without long-term current use of insulin  Assessment & Plan:  Current A1c 6.1%.  Prescribed metformin 1000 mg b.i.d..  Most recent hemoglobin A1c shows good control on diabetic treatment plan, including diabetic prescription medication.  Continue current treatment plan, medical regimen, and lifestyle modification as discussed at this visit and during previous visits.    Component Ref Range & Units 1 d ago  (4/3/23) 1 yr ago  (4/1/22) 2 yr ago  (1/28/21) 3 yr ago  (2/4/20) 3 yr ago  (4/16/19) 4 yr ago  (1/8/19) 5 yr ago  (3/6/18)   Hemoglobin A1c <=7.0 % 6.1  5.5 R  5.6 R  6.3 R  5.9 R  6.6 High  R  5.8 R    Estimated Average Glucose mg/dL 128.4  111.2 R  114.0  134  123  143  120            5. Dyslipidemia  Assessment & Plan:  Prescribed atorvastatin 40 mg daily.  Most recent cholesterol/lipid blood testing shows adequate control, continue current treatment plan, including prescription medications if prescribed, and/or diet high in fiber, low in saturated fats as discussed during clinic visit.    Component Ref Range & Units 1 d ago  (4/3/23) 1 yr ago  (4/1/22) 2 yr ago  (1/28/21) 3 yr ago  (2/4/20) 4 yr ago  (1/8/19) 4 yr ago  (6/20/18) 5 yr ago  (3/6/18)   Cholesterol Total <=200 mg/dL 139  132 R  134 R  127 R  156 R  143 R  224 High  R    HDL Cholesterol 35 - 60 mg/dL 51  45 R  42  50  53  50  42    Triglyceride 34 - 140 mg/dL 88  95 R  93  70 R  82 R  102 R  150 R     Cholesterol/HDL Ratio 0 - 5 3  3  3  2.5 R  2.9 R  2.9 R  5.3 High  R    Very Low Density Lipoprotein  18  19  19  14 R  16 R  20 R  30 R    LDL Cholesterol 50.00 - 140.00 mg/dL 70.00  68.00 R  73.00  63 R  87 R  73 R  152 High  R           6. JOE (generalized anxiety disorder)  Assessment & Plan:  Prescribed sertraline 100 mg daily.  Patient reports stability of moods, and effectiveness of medications, including no agitation, significant somnolence, or significant bouts of anxiety or depression.  Patient is not having any sleep disturbance.  Current treatment plan will continue, with plans to discuss any significant changes in patient's status if necessary if anything changes in the future.      7. Anemia, unspecified type  Assessment & Plan:  Component Ref Range & Units 1 d ago  (4/3/23) 1 yr ago  (4/1/22) 1 yr ago  (4/1/22) 2 yr ago  (1/28/21) 2 yr ago  (1/28/21) 3 yr ago  (2/4/20) 3 yr ago  (2/4/20)   WBC 4.5 - 11.5 x10(3)/mcL 7.0  7.8 R   7.1   6.6     RBC 4.70 - 6.10 x10(6)/mcL 4.33 Low   4.62 R   4.57 Low    4.67 Low      Hgb 14.0 - 18.0 g/dL 12.5 Low   13.7 R   13.6 Low  R   13.6 Low  R     Hct 42.0 - 52.0 % 39.0 Low   42.5 R   43.5   44.2     MCV 80.0 - 94.0 fL 90.1  92.0 R   95.2 High    94.6 High        Anemia mild, fairly stable, we will continue to monitor.      8. Overweight (BMI 25.0-29.9)  Assessment & Plan:  Weight loss, healthy dietary choices, increased activity and exercise are all recommended.                 Medication List with Changes/Refills   Current Medications    AMLODIPINE (NORVASC) 5 MG TABLET    Take 1 tablet (5 mg total) by mouth once daily.       Start Date: 11/21/2022End Date: 11/21/2023    ATORVASTATIN (LIPITOR) 40 MG TABLET    Take 1 tablet (40 mg total) by mouth once daily.       Start Date: 6/20/2022 End Date: 6/20/2023    CALCIUM CARBONATE (CALCIUM 600 ORAL)    Take 1 tablet by mouth Daily.       Start Date: --        End Date: --    CLONAZEPAM (KLONOPIN) 1 MG TABLET    Take  1 tablet (1 mg total) by mouth every evening.       Start Date: 12/28/2022End Date: --    CO-ENZYME Q-10 30 MG CAPSULE    Take 30 mg by mouth once daily.       Start Date: --        End Date: --    HYDROCHLOROTHIAZIDE (HYDRODIURIL) 25 MG TABLET    Take 1 tablet (25 mg total) by mouth once daily.       Start Date: 6/16/2022 End Date: --    LISINOPRIL 10 MG TABLET    Take 10 mg by mouth.       Start Date: --        End Date: --    MAGNESIUM OXIDE 400 MG MAGNESIUM CAP    Take 1 tablet by mouth Daily.       Start Date: --        End Date: --    MEN'S MULTI-VITAMIN ORAL    Take 1 tablet by mouth Daily.       Start Date: --        End Date: --    METFORMIN (GLUCOPHAGE) 1000 MG TABLET    Take 1 tablet (1,000 mg total) by mouth 2 (two) times daily.       Start Date: 4/4/2023  End Date: 4/3/2024    OMEGA-3 FATTY ACIDS 1,000 MG CAP    Take 1 capsule by mouth Daily.       Start Date: --        End Date: --    OMEPRAZOLE (PRILOSEC) 20 MG CAPSULE    Take 20 mg by mouth.       Start Date: --        End Date: --    SERTRALINE (ZOLOFT) 100 MG TABLET    Take 1 tablet (100 mg total) by mouth once daily.       Start Date: 10/3/2022 End Date: --   Discontinued Medications    ESOMEPRAZOLE (NEXIUM) 20 MG CAPSULE    Take 20 mg by mouth before breakfast.       Start Date: --        End Date: 4/5/2023        Follow up in about 27 weeks (around 10/11/2023) for Chronic condition F/up. In addition to their scheduled follow up, the patient has also been instructed to follow up on as needed basis.

## 2023-04-05 NOTE — ASSESSMENT & PLAN NOTE
Prescribed amlodipine 5 mg daily, hydrochlorothiazide 25 mg daily.  Blood pressures appear to be adequately controlled with current treatment plan, including prescription blood pressure medication. Continue medical management and continue home blood pressure checks.  Blood pressure should be checked as discussed during medical visits, and average blood pressure should be no greater than 130/80.

## 2023-04-05 NOTE — ASSESSMENT & PLAN NOTE
Prescribed sertraline 100 mg daily.  Patient reports stability of moods, and effectiveness of medications, including no agitation, significant somnolence, or significant bouts of anxiety or depression.  Patient is not having any sleep disturbance.  Current treatment plan will continue, with plans to discuss any significant changes in patient's status if necessary if anything changes in the future.

## 2023-06-06 ENCOUNTER — PATIENT OUTREACH (OUTPATIENT)
Dept: ADMINISTRATIVE | Facility: HOSPITAL | Age: 68
End: 2023-06-06
Payer: MEDICARE

## 2023-06-06 NOTE — LETTER
"  This communication is flagged as high priority.        AUTHORIZATION FOR RELEASE OF   CONFIDENTIAL INFORMATION    Dear Dr. Felix Staff,    We are seeing Pito Garcia, date of birth 1955, in the clinic at [facility]. Scooter Michelle MD is the patient's PCP. Pito Garcia has an outstanding lab/procedure at the time we reviewed his chart. In order to help keep his health information updated, he has authorized us to request the following medical record(s):        (  )  MAMMOGRAM                                      (  )  COLONOSCOPY      (  )  PAP SMEAR                                          (  )  OUTSIDE LAB RESULTS     (  )  DEXA SCAN                                          (  xx)  DM EYE EXAM  ,           (  )  FOOT EXAM                                          (  )  ENTIRE RECORD     (  )  OUTSIDE IMMUNIZATIONS                 (  )  _______________         Please fax records to [facility], Pernell J Simon, MD, [phone]        If you have any questions, please contact Yves Astorga (Connie)Care Coordinator @ [phone]       Patient Name: Pito Garcia  : 1955  Patient Phone #: [phone]     "

## 2023-06-12 DIAGNOSIS — I10 HYPERTENSION, UNSPECIFIED TYPE: ICD-10-CM

## 2023-06-12 RX ORDER — HYDROCHLOROTHIAZIDE 25 MG/1
25 TABLET ORAL DAILY
Qty: 90 TABLET | Refills: 3 | Status: SHIPPED | OUTPATIENT
Start: 2023-06-12

## 2023-06-28 ENCOUNTER — OFFICE VISIT (OUTPATIENT)
Dept: NEUROLOGY | Facility: CLINIC | Age: 68
End: 2023-06-28
Payer: MEDICARE

## 2023-06-28 VITALS
BODY MASS INDEX: 28.28 KG/M2 | SYSTOLIC BLOOD PRESSURE: 112 MMHG | HEIGHT: 66 IN | WEIGHT: 176 LBS | DIASTOLIC BLOOD PRESSURE: 66 MMHG

## 2023-06-28 DIAGNOSIS — G47.52 REM SLEEP BEHAVIOR DISORDER: Primary | ICD-10-CM

## 2023-06-28 PROCEDURE — 3074F PR MOST RECENT SYSTOLIC BLOOD PRESSURE < 130 MM HG: ICD-10-PCS | Mod: CPTII,S$GLB,, | Performed by: PSYCHIATRY & NEUROLOGY

## 2023-06-28 PROCEDURE — 4010F ACE/ARB THERAPY RXD/TAKEN: CPT | Mod: CPTII,S$GLB,, | Performed by: PSYCHIATRY & NEUROLOGY

## 2023-06-28 PROCEDURE — 3061F NEG MICROALBUMINURIA REV: CPT | Mod: CPTII,S$GLB,, | Performed by: PSYCHIATRY & NEUROLOGY

## 2023-06-28 PROCEDURE — 99999 PR PBB SHADOW E&M-EST. PATIENT-LVL III: CPT | Mod: PBBFAC,,, | Performed by: PSYCHIATRY & NEUROLOGY

## 2023-06-28 PROCEDURE — 3066F PR DOCUMENTATION OF TREATMENT FOR NEPHROPATHY: ICD-10-PCS | Mod: CPTII,S$GLB,, | Performed by: PSYCHIATRY & NEUROLOGY

## 2023-06-28 PROCEDURE — 4010F PR ACE/ARB THEARPY RXD/TAKEN: ICD-10-PCS | Mod: CPTII,S$GLB,, | Performed by: PSYCHIATRY & NEUROLOGY

## 2023-06-28 PROCEDURE — 1101F PR PT FALLS ASSESS DOC 0-1 FALLS W/OUT INJ PAST YR: ICD-10-PCS | Mod: CPTII,S$GLB,, | Performed by: PSYCHIATRY & NEUROLOGY

## 2023-06-28 PROCEDURE — 3078F PR MOST RECENT DIASTOLIC BLOOD PRESSURE < 80 MM HG: ICD-10-PCS | Mod: CPTII,S$GLB,, | Performed by: PSYCHIATRY & NEUROLOGY

## 2023-06-28 PROCEDURE — 3288F PR FALLS RISK ASSESSMENT DOCUMENTED: ICD-10-PCS | Mod: CPTII,S$GLB,, | Performed by: PSYCHIATRY & NEUROLOGY

## 2023-06-28 PROCEDURE — 1125F AMNT PAIN NOTED PAIN PRSNT: CPT | Mod: CPTII,S$GLB,, | Performed by: PSYCHIATRY & NEUROLOGY

## 2023-06-28 PROCEDURE — 3008F BODY MASS INDEX DOCD: CPT | Mod: CPTII,S$GLB,, | Performed by: PSYCHIATRY & NEUROLOGY

## 2023-06-28 PROCEDURE — 3066F NEPHROPATHY DOC TX: CPT | Mod: CPTII,S$GLB,, | Performed by: PSYCHIATRY & NEUROLOGY

## 2023-06-28 PROCEDURE — 99999 PR PBB SHADOW E&M-EST. PATIENT-LVL III: ICD-10-PCS | Mod: PBBFAC,,, | Performed by: PSYCHIATRY & NEUROLOGY

## 2023-06-28 PROCEDURE — 99212 PR OFFICE/OUTPT VISIT, EST, LEVL II, 10-19 MIN: ICD-10-PCS | Mod: S$GLB,,, | Performed by: PSYCHIATRY & NEUROLOGY

## 2023-06-28 PROCEDURE — 1159F PR MEDICATION LIST DOCUMENTED IN MEDICAL RECORD: ICD-10-PCS | Mod: CPTII,S$GLB,, | Performed by: PSYCHIATRY & NEUROLOGY

## 2023-06-28 PROCEDURE — 1125F PR PAIN SEVERITY QUANTIFIED, PAIN PRESENT: ICD-10-PCS | Mod: CPTII,S$GLB,, | Performed by: PSYCHIATRY & NEUROLOGY

## 2023-06-28 PROCEDURE — 3078F DIAST BP <80 MM HG: CPT | Mod: CPTII,S$GLB,, | Performed by: PSYCHIATRY & NEUROLOGY

## 2023-06-28 PROCEDURE — 1101F PT FALLS ASSESS-DOCD LE1/YR: CPT | Mod: CPTII,S$GLB,, | Performed by: PSYCHIATRY & NEUROLOGY

## 2023-06-28 PROCEDURE — 1159F MED LIST DOCD IN RCRD: CPT | Mod: CPTII,S$GLB,, | Performed by: PSYCHIATRY & NEUROLOGY

## 2023-06-28 PROCEDURE — 3288F FALL RISK ASSESSMENT DOCD: CPT | Mod: CPTII,S$GLB,, | Performed by: PSYCHIATRY & NEUROLOGY

## 2023-06-28 PROCEDURE — 99212 OFFICE O/P EST SF 10 MIN: CPT | Mod: S$GLB,,, | Performed by: PSYCHIATRY & NEUROLOGY

## 2023-06-28 PROCEDURE — 3008F PR BODY MASS INDEX (BMI) DOCUMENTED: ICD-10-PCS | Mod: CPTII,S$GLB,, | Performed by: PSYCHIATRY & NEUROLOGY

## 2023-06-28 PROCEDURE — 3061F PR NEG MICROALBUMINURIA RESULT DOCUMENTED/REVIEW: ICD-10-PCS | Mod: CPTII,S$GLB,, | Performed by: PSYCHIATRY & NEUROLOGY

## 2023-06-28 PROCEDURE — 3074F SYST BP LT 130 MM HG: CPT | Mod: CPTII,S$GLB,, | Performed by: PSYCHIATRY & NEUROLOGY

## 2023-06-28 RX ORDER — CLONAZEPAM 1 MG/1
1 TABLET ORAL NIGHTLY
Qty: 90 EACH | Refills: 1 | Status: SHIPPED | OUTPATIENT
Start: 2023-07-18 | End: 2023-12-20 | Stop reason: SDUPTHER

## 2023-06-28 RX ORDER — SODIUM, POTASSIUM,MAG SULFATES 17.5-3.13G
SOLUTION, RECONSTITUTED, ORAL ORAL
COMMUNITY
Start: 2023-02-06

## 2023-06-28 NOTE — PROGRESS NOTES
Subjective     Patient ID: Pito Garcia is a 68 y.o. male.    Chief Complaint: REM Behavioral Disorder    HPI  Reducing the zoloft did not help the RBD  Still has it; not nightly  Takes 1 mg clonazepam nightly  No new issues  Review of Systems  The remainder of the 14 system ROS is noncontributory or negative unless mentioned/reviewed above.       Objective     Physical Exam  Mental Status: Alert and oriented x3. Language is fluent with good comprehension.    Cranial Nerve: Pupils are equal, round, and reactive to light. Visual fields are intact to confrontation. Normal fundi. Ocular movements are intact. Face is symmetric at rest and with activation with intact sensation throughout. Hearing intact to finger rub bilaterally. Muscles of tongue and palate activate symmetrically. No dysarthria. Strength is full in sternocleidomastoid and trapezius bilaterally.    Motor: Muscle bulk and tone are normal. Strength is 5/5 in all four extremities both proximally and distally. Intact fine motor movements bilaterally. There is no pronator drift or satelliting on arm roll.    Sensory: Sensation is intact to light touch, pinprick, vibration, and proprioception throughout. Romberg is negative.    Reflexes: 2+ and symmetric at the biceps, triceps, brachioradialis, patella, and Achilles bilaterally. Plantar response is flexor bilaterally.    Coordination: No dysmetria on finger-nose-finger or heel-knee-shin. Normal rapid alternating movements. Fast finger tapping with normal amplitude and speed.    Gait: Narrow based with normal stride length and good arm swing bilaterally. Able to walk on heels, toes, and in tandem.       Assessment and Plan         Continue clonazepam         Follow up in about 6 months (around 12/28/2023).

## 2023-07-21 DIAGNOSIS — E78.5 DYSLIPIDEMIA: Primary | Chronic | ICD-10-CM

## 2023-07-21 RX ORDER — ATORVASTATIN CALCIUM 40 MG/1
40 TABLET, FILM COATED ORAL DAILY
Qty: 90 TABLET | Refills: 3 | Status: SHIPPED | OUTPATIENT
Start: 2023-07-21 | End: 2024-07-20

## 2023-08-29 ENCOUNTER — PATIENT MESSAGE (OUTPATIENT)
Dept: ADMINISTRATIVE | Facility: HOSPITAL | Age: 68
End: 2023-08-29
Payer: MEDICARE

## 2023-09-05 NOTE — PROGRESS NOTES
"Subjective:       Patient ID: Pito Garcia is a 68 y.o. male.    Chief Complaint: Neck Pain    Established patient, presents to the clinic complaining of head and neck aches.  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  About 50% better, not bothersome, just wanted to mention it.  Has had successful physical therapy in the fast.  Otherwise, this is chronic condition visit.  No chronic issues.    Last wellness exam was 04/05/2023.        Review of Systems   Constitutional: Negative.  Negative for fatigue and fever.   HENT: Negative.  Negative for sore throat and trouble swallowing.    Eyes: Negative.  Negative for redness and visual disturbance.   Respiratory: Negative.  Negative for chest tightness and shortness of breath.    Cardiovascular: Negative.  Negative for chest pain.   Gastrointestinal: Negative.  Negative for abdominal pain, blood in stool, change in bowel habit, nausea and change in bowel habit.   Endocrine: Negative.  Negative for cold intolerance, heat intolerance and polyuria.   Genitourinary: Negative.    Musculoskeletal:  Positive for neck pain. Negative for arthralgias, gait problem and joint swelling.   Integumentary:  Negative for rash. Negative.   Neurological: Negative.  Negative for dizziness, weakness and headaches.   Psychiatric/Behavioral: Negative.  Negative for agitation and dysphoric mood.          Objective:     Vitals:    09/06/23 1124   BP: 125/72   Pulse: 68   Resp: 18   SpO2: 97%   Weight: 81.6 kg (180 lb)   Height: 5' 6" (1.676 m)   Body mass index is 29.05 kg/m².     Physical Exam  Constitutional:       Appearance: Normal appearance.   Eyes:      Conjunctiva/sclera: Conjunctivae normal.   Cardiovascular:      Rate and Rhythm: Normal rate and regular rhythm.   Pulmonary:      Effort: Pulmonary effort is normal.      Breath sounds: Normal breath sounds.   Skin:     General: Skin is warm and " dry.   Neurological:      Mental Status: He is alert.   Psychiatric:         Mood and Affect: Mood normal.         Behavior: Behavior normal.           Assessment:     Problem List Items Addressed This Visit          Cardiac/Vascular    Primary hypertension (Chronic)    Dyslipidemia (Chronic)    Relevant Orders    CBC Auto Differential    Lipid Panel    TSH       Endocrine    Type 2 diabetes mellitus without complication, without long-term current use of insulin (Chronic)    Relevant Orders    CBC Auto Differential    Hemoglobin A1C    Microalbumin/Creatinine Ratio, Urine       Orthopedic    Neck pain - Primary (Chronic)    Current Assessment & Plan     Seems to be improving on its own, if it becomes an issue contact me, we can re-evaluate, I would suggest revisiting physical therapy at some point necessary.          Other Visit Diagnoses       Wellness examination        This diagnosis does not apply to this visit, wellness exam with pre visit labs will be scheduled/ordered for future visit.    Relevant Orders    Comprehensive Metabolic Panel    CBC Auto Differential    Lipid Panel    Hemoglobin A1C    TSH    PSA, Screening    Screening for prostate cancer        This diagnosis does not apply to this visit, appropriate prostate cancer screening will be ordered for next visit/wellness exam.    Relevant Orders    PSA, Screening               Current Outpatient Medications   Medication Instructions    amLODIPine (NORVASC) 5 mg, Oral, Daily    atorvastatin (LIPITOR) 40 mg, Oral, Daily    calcium carbonate (CALCIUM 600 ORAL) 1 tablet, Oral, Daily    clonazePAM (KLONOPIN) 1 mg, Oral, Nightly    co-enzyme Q-10 30 mg, Oral, Daily    hydroCHLOROthiazide (HYDRODIURIL) 25 mg, Oral, Daily    lisinopriL 10 mg, Oral    magnesium oxide 400 mg magnesium Cap 1 tablet, Oral, Daily    MEN'S MULTI-VITAMIN ORAL 1 tablet, Oral, Daily    metFORMIN (GLUCOPHAGE) 1,000 mg, Oral, 2 times daily    mv-mn/iron/folic acid/herb 190 (VITAMIN D3  COMPLETE ORAL) Oral    omega-3 fatty acids 1,000 mg Cap 1 capsule, Oral, Daily    omeprazole (PRILOSEC) 20 mg, Oral    sertraline (ZOLOFT) 100 mg, Oral, Daily    sodium,potassium,mag sulfates (SUPREP BOWEL PREP KIT) 17.5-3.13-1.6 gram SolR SMARTSI Kit(s) By Mouth Once     Plan:             Follow up in about 7 months (around 2024) for Medicare Wellness.

## 2023-09-06 ENCOUNTER — OFFICE VISIT (OUTPATIENT)
Dept: PRIMARY CARE CLINIC | Facility: CLINIC | Age: 68
End: 2023-09-06
Payer: MEDICARE

## 2023-09-06 VITALS
WEIGHT: 180 LBS | HEART RATE: 68 BPM | RESPIRATION RATE: 18 BRPM | SYSTOLIC BLOOD PRESSURE: 125 MMHG | BODY MASS INDEX: 28.93 KG/M2 | HEIGHT: 66 IN | DIASTOLIC BLOOD PRESSURE: 72 MMHG | OXYGEN SATURATION: 97 %

## 2023-09-06 DIAGNOSIS — Z12.5 SCREENING FOR PROSTATE CANCER: ICD-10-CM

## 2023-09-06 DIAGNOSIS — Z00.00 WELLNESS EXAMINATION: ICD-10-CM

## 2023-09-06 DIAGNOSIS — I10 PRIMARY HYPERTENSION: Chronic | ICD-10-CM

## 2023-09-06 DIAGNOSIS — E78.5 DYSLIPIDEMIA: Chronic | ICD-10-CM

## 2023-09-06 DIAGNOSIS — E11.9 TYPE 2 DIABETES MELLITUS WITHOUT COMPLICATION, WITHOUT LONG-TERM CURRENT USE OF INSULIN: Chronic | ICD-10-CM

## 2023-09-06 DIAGNOSIS — M54.2 NECK PAIN: Primary | Chronic | ICD-10-CM

## 2023-09-06 PROCEDURE — 3074F PR MOST RECENT SYSTOLIC BLOOD PRESSURE < 130 MM HG: ICD-10-PCS | Mod: CPTII,,, | Performed by: GENERAL PRACTICE

## 2023-09-06 PROCEDURE — 3008F BODY MASS INDEX DOCD: CPT | Mod: CPTII,,, | Performed by: GENERAL PRACTICE

## 2023-09-06 PROCEDURE — 3288F FALL RISK ASSESSMENT DOCD: CPT | Mod: CPTII,,, | Performed by: GENERAL PRACTICE

## 2023-09-06 PROCEDURE — 3288F PR FALLS RISK ASSESSMENT DOCUMENTED: ICD-10-PCS | Mod: CPTII,,, | Performed by: GENERAL PRACTICE

## 2023-09-06 PROCEDURE — 3008F PR BODY MASS INDEX (BMI) DOCUMENTED: ICD-10-PCS | Mod: CPTII,,, | Performed by: GENERAL PRACTICE

## 2023-09-06 PROCEDURE — 3078F PR MOST RECENT DIASTOLIC BLOOD PRESSURE < 80 MM HG: ICD-10-PCS | Mod: CPTII,,, | Performed by: GENERAL PRACTICE

## 2023-09-06 PROCEDURE — 1160F PR REVIEW ALL MEDS BY PRESCRIBER/CLIN PHARMACIST DOCUMENTED: ICD-10-PCS | Mod: CPTII,,, | Performed by: GENERAL PRACTICE

## 2023-09-06 PROCEDURE — 3061F NEG MICROALBUMINURIA REV: CPT | Mod: CPTII,,, | Performed by: GENERAL PRACTICE

## 2023-09-06 PROCEDURE — 1101F PR PT FALLS ASSESS DOC 0-1 FALLS W/OUT INJ PAST YR: ICD-10-PCS | Mod: CPTII,,, | Performed by: GENERAL PRACTICE

## 2023-09-06 PROCEDURE — 1101F PT FALLS ASSESS-DOCD LE1/YR: CPT | Mod: CPTII,,, | Performed by: GENERAL PRACTICE

## 2023-09-06 PROCEDURE — 1160F RVW MEDS BY RX/DR IN RCRD: CPT | Mod: CPTII,,, | Performed by: GENERAL PRACTICE

## 2023-09-06 PROCEDURE — 1159F MED LIST DOCD IN RCRD: CPT | Mod: CPTII,,, | Performed by: GENERAL PRACTICE

## 2023-09-06 PROCEDURE — 99213 OFFICE O/P EST LOW 20 MIN: CPT | Mod: ,,, | Performed by: GENERAL PRACTICE

## 2023-09-06 PROCEDURE — 3066F PR DOCUMENTATION OF TREATMENT FOR NEPHROPATHY: ICD-10-PCS | Mod: CPTII,,, | Performed by: GENERAL PRACTICE

## 2023-09-06 PROCEDURE — 3074F SYST BP LT 130 MM HG: CPT | Mod: CPTII,,, | Performed by: GENERAL PRACTICE

## 2023-09-06 PROCEDURE — 3061F PR NEG MICROALBUMINURIA RESULT DOCUMENTED/REVIEW: ICD-10-PCS | Mod: CPTII,,, | Performed by: GENERAL PRACTICE

## 2023-09-06 PROCEDURE — 3078F DIAST BP <80 MM HG: CPT | Mod: CPTII,,, | Performed by: GENERAL PRACTICE

## 2023-09-06 PROCEDURE — 1159F PR MEDICATION LIST DOCUMENTED IN MEDICAL RECORD: ICD-10-PCS | Mod: CPTII,,, | Performed by: GENERAL PRACTICE

## 2023-09-06 PROCEDURE — 4010F PR ACE/ARB THEARPY RXD/TAKEN: ICD-10-PCS | Mod: CPTII,,, | Performed by: GENERAL PRACTICE

## 2023-09-06 PROCEDURE — 3066F NEPHROPATHY DOC TX: CPT | Mod: CPTII,,, | Performed by: GENERAL PRACTICE

## 2023-09-06 PROCEDURE — 3044F HG A1C LEVEL LT 7.0%: CPT | Mod: CPTII,,, | Performed by: GENERAL PRACTICE

## 2023-09-06 PROCEDURE — 4010F ACE/ARB THERAPY RXD/TAKEN: CPT | Mod: CPTII,,, | Performed by: GENERAL PRACTICE

## 2023-09-06 PROCEDURE — 99213 PR OFFICE/OUTPT VISIT, EST, LEVL III, 20-29 MIN: ICD-10-PCS | Mod: ,,, | Performed by: GENERAL PRACTICE

## 2023-09-06 PROCEDURE — 3044F PR MOST RECENT HEMOGLOBIN A1C LEVEL <7.0%: ICD-10-PCS | Mod: CPTII,,, | Performed by: GENERAL PRACTICE

## 2023-09-06 NOTE — ASSESSMENT & PLAN NOTE
Seems to be improving on its own, if it becomes an issue contact me, we can re-evaluate, I would suggest revisiting physical therapy at some point necessary.

## 2023-09-06 NOTE — LETTER
AUTHORIZATION FOR RELEASE OF   CONFIDENTIAL INFORMATION    Dear Dr Cuauhtemoc Mccartney,    We are seeing Pito Garcia, date of birth 1955, in the clinic at [facility]. Scooter Michelle MD is the patient's PCP. Pito Garcia has an outstanding lab/procedure at the time we reviewed his chart. In order to help keep his health information updated, he has authorized us to request the following medical record(s):        (  )  MAMMOGRAM                                      (  )  COLONOSCOPY      (  )  PAP SMEAR                                          (  )  OUTSIDE LAB RESULTS     (  )  DEXA SCAN                                          (  X)  EYE EXAM            (  )  FOOT EXAM                                          (  )  ENTIRE RECORD     (  )  OUTSIDE IMMUNIZATIONS                 (  )  _______________         Please fax records to [facility], Simon, Pern[place], [place], [phone]     If you have any questions, please contact Marci Jauregui LPN at [phone].           Patient Name: Pito Garcia  : 1955  Patient Phone #: [phone]

## 2023-09-07 ENCOUNTER — PATIENT MESSAGE (OUTPATIENT)
Dept: RESEARCH | Facility: HOSPITAL | Age: 68
End: 2023-09-07
Payer: MEDICARE

## 2023-09-08 ENCOUNTER — DOCUMENTATION ONLY (OUTPATIENT)
Dept: PRIMARY CARE CLINIC | Facility: CLINIC | Age: 68
End: 2023-09-08
Payer: MEDICARE

## 2023-10-23 DIAGNOSIS — I10 PRIMARY HYPERTENSION: Primary | Chronic | ICD-10-CM

## 2023-10-23 RX ORDER — AMLODIPINE BESYLATE 5 MG/1
5 TABLET ORAL DAILY
Qty: 90 TABLET | Refills: 3 | Status: SHIPPED | OUTPATIENT
Start: 2023-10-23 | End: 2024-10-22

## 2023-10-26 LAB
LEFT EYE DM RETINOPATHY: NEGATIVE
RIGHT EYE DM RETINOPATHY: NEGATIVE

## 2023-10-30 ENCOUNTER — DOCUMENTATION ONLY (OUTPATIENT)
Dept: PRIMARY CARE CLINIC | Facility: CLINIC | Age: 68
End: 2023-10-30
Payer: MEDICARE

## 2023-11-02 DIAGNOSIS — F41.1 GAD (GENERALIZED ANXIETY DISORDER): Primary | Chronic | ICD-10-CM

## 2023-11-02 RX ORDER — SERTRALINE HYDROCHLORIDE 100 MG/1
100 TABLET, FILM COATED ORAL DAILY
Qty: 90 TABLET | Refills: 3 | Status: SHIPPED | OUTPATIENT
Start: 2023-11-02

## 2023-12-20 ENCOUNTER — OFFICE VISIT (OUTPATIENT)
Dept: NEUROLOGY | Facility: CLINIC | Age: 68
End: 2023-12-20
Payer: MEDICARE

## 2023-12-20 VITALS
SYSTOLIC BLOOD PRESSURE: 120 MMHG | DIASTOLIC BLOOD PRESSURE: 70 MMHG | WEIGHT: 180 LBS | HEIGHT: 66 IN | BODY MASS INDEX: 28.93 KG/M2

## 2023-12-20 DIAGNOSIS — G47.52 REM SLEEP BEHAVIOR DISORDER: Primary | ICD-10-CM

## 2023-12-20 PROCEDURE — 3288F PR FALLS RISK ASSESSMENT DOCUMENTED: ICD-10-PCS | Mod: CPTII,S$GLB,, | Performed by: PSYCHIATRY & NEUROLOGY

## 2023-12-20 PROCEDURE — 99999 PR PBB SHADOW E&M-EST. PATIENT-LVL III: ICD-10-PCS | Mod: PBBFAC,,, | Performed by: PSYCHIATRY & NEUROLOGY

## 2023-12-20 PROCEDURE — 3066F NEPHROPATHY DOC TX: CPT | Mod: CPTII,S$GLB,, | Performed by: PSYCHIATRY & NEUROLOGY

## 2023-12-20 PROCEDURE — 3008F PR BODY MASS INDEX (BMI) DOCUMENTED: ICD-10-PCS | Mod: CPTII,S$GLB,, | Performed by: PSYCHIATRY & NEUROLOGY

## 2023-12-20 PROCEDURE — 3074F SYST BP LT 130 MM HG: CPT | Mod: CPTII,S$GLB,, | Performed by: PSYCHIATRY & NEUROLOGY

## 2023-12-20 PROCEDURE — 1101F PR PT FALLS ASSESS DOC 0-1 FALLS W/OUT INJ PAST YR: ICD-10-PCS | Mod: CPTII,S$GLB,, | Performed by: PSYCHIATRY & NEUROLOGY

## 2023-12-20 PROCEDURE — 3074F PR MOST RECENT SYSTOLIC BLOOD PRESSURE < 130 MM HG: ICD-10-PCS | Mod: CPTII,S$GLB,, | Performed by: PSYCHIATRY & NEUROLOGY

## 2023-12-20 PROCEDURE — 1101F PT FALLS ASSESS-DOCD LE1/YR: CPT | Mod: CPTII,S$GLB,, | Performed by: PSYCHIATRY & NEUROLOGY

## 2023-12-20 PROCEDURE — 4010F PR ACE/ARB THEARPY RXD/TAKEN: ICD-10-PCS | Mod: CPTII,S$GLB,, | Performed by: PSYCHIATRY & NEUROLOGY

## 2023-12-20 PROCEDURE — 3066F PR DOCUMENTATION OF TREATMENT FOR NEPHROPATHY: ICD-10-PCS | Mod: CPTII,S$GLB,, | Performed by: PSYCHIATRY & NEUROLOGY

## 2023-12-20 PROCEDURE — 3061F PR NEG MICROALBUMINURIA RESULT DOCUMENTED/REVIEW: ICD-10-PCS | Mod: CPTII,S$GLB,, | Performed by: PSYCHIATRY & NEUROLOGY

## 2023-12-20 PROCEDURE — 3061F NEG MICROALBUMINURIA REV: CPT | Mod: CPTII,S$GLB,, | Performed by: PSYCHIATRY & NEUROLOGY

## 2023-12-20 PROCEDURE — 3044F PR MOST RECENT HEMOGLOBIN A1C LEVEL <7.0%: ICD-10-PCS | Mod: CPTII,S$GLB,, | Performed by: PSYCHIATRY & NEUROLOGY

## 2023-12-20 PROCEDURE — 1125F AMNT PAIN NOTED PAIN PRSNT: CPT | Mod: CPTII,S$GLB,, | Performed by: PSYCHIATRY & NEUROLOGY

## 2023-12-20 PROCEDURE — 1159F MED LIST DOCD IN RCRD: CPT | Mod: CPTII,S$GLB,, | Performed by: PSYCHIATRY & NEUROLOGY

## 2023-12-20 PROCEDURE — 4010F ACE/ARB THERAPY RXD/TAKEN: CPT | Mod: CPTII,S$GLB,, | Performed by: PSYCHIATRY & NEUROLOGY

## 2023-12-20 PROCEDURE — 99213 OFFICE O/P EST LOW 20 MIN: CPT | Mod: S$GLB,,, | Performed by: PSYCHIATRY & NEUROLOGY

## 2023-12-20 PROCEDURE — 99999 PR PBB SHADOW E&M-EST. PATIENT-LVL III: CPT | Mod: PBBFAC,,, | Performed by: PSYCHIATRY & NEUROLOGY

## 2023-12-20 PROCEDURE — 3044F HG A1C LEVEL LT 7.0%: CPT | Mod: CPTII,S$GLB,, | Performed by: PSYCHIATRY & NEUROLOGY

## 2023-12-20 PROCEDURE — 3078F DIAST BP <80 MM HG: CPT | Mod: CPTII,S$GLB,, | Performed by: PSYCHIATRY & NEUROLOGY

## 2023-12-20 PROCEDURE — 99213 PR OFFICE/OUTPT VISIT, EST, LEVL III, 20-29 MIN: ICD-10-PCS | Mod: S$GLB,,, | Performed by: PSYCHIATRY & NEUROLOGY

## 2023-12-20 PROCEDURE — 1125F PR PAIN SEVERITY QUANTIFIED, PAIN PRESENT: ICD-10-PCS | Mod: CPTII,S$GLB,, | Performed by: PSYCHIATRY & NEUROLOGY

## 2023-12-20 PROCEDURE — 3008F BODY MASS INDEX DOCD: CPT | Mod: CPTII,S$GLB,, | Performed by: PSYCHIATRY & NEUROLOGY

## 2023-12-20 PROCEDURE — 3288F FALL RISK ASSESSMENT DOCD: CPT | Mod: CPTII,S$GLB,, | Performed by: PSYCHIATRY & NEUROLOGY

## 2023-12-20 PROCEDURE — 1159F PR MEDICATION LIST DOCUMENTED IN MEDICAL RECORD: ICD-10-PCS | Mod: CPTII,S$GLB,, | Performed by: PSYCHIATRY & NEUROLOGY

## 2023-12-20 PROCEDURE — 3078F PR MOST RECENT DIASTOLIC BLOOD PRESSURE < 80 MM HG: ICD-10-PCS | Mod: CPTII,S$GLB,, | Performed by: PSYCHIATRY & NEUROLOGY

## 2023-12-20 RX ORDER — CLONAZEPAM 1 MG/1
1 TABLET ORAL NIGHTLY
Qty: 90 TABLET | Refills: 1 | Status: SHIPPED | OUTPATIENT
Start: 2023-12-20

## 2023-12-20 RX ORDER — BUDESONIDE 0.5 MG/2ML
INHALANT ORAL
COMMUNITY
Start: 2023-12-14

## 2023-12-20 RX ORDER — CEFDINIR 300 MG/1
300 CAPSULE ORAL 2 TIMES DAILY
COMMUNITY
Start: 2023-12-06

## 2023-12-20 NOTE — PROGRESS NOTES
Subjective     Patient ID: Pito Garcia is a 68 y.o. male.    Chief Complaint: REM sleep behavio disorder    HPI  Occ RBD episodes  Mild memory loss  ?s regarding DLB  Review of Systems  The remainder of the 14 system ROS is noncontributory or negative unless mentioned/reviewed above.       Objective     Physical Exam  Mental Status: Alert and oriented x3. Language is fluent with good comprehension.    Cranial Nerve: Pupils are equal, round, and reactive to light. Visual fields are intact to confrontation. Normal fundi. Ocular movements are intact. Face is symmetric at rest and with activation with intact sensation throughout. Hearing intact to finger rub bilaterally. Muscles of tongue and palate activate symmetrically. No dysarthria. Strength is full in sternocleidomastoid and trapezius bilaterally.    Motor: Muscle bulk and tone are normal. Strength is 5/5 in all four extremities both proximally and distally. Intact fine motor movements bilaterally. There is no pronator drift or satelliting on arm roll.    Sensory: Sensation is intact to light touch, pinprick, vibration, and proprioception throughout. Romberg is negative.    Reflexes: 2+ and symmetric at the biceps, triceps, brachioradialis, patella, and Achilles bilaterally. Plantar response is flexor bilaterally.    Coordination: No dysmetria on finger-nose-finger or heel-knee-shin. Normal rapid alternating movements. Fast finger tapping with normal amplitude and speed.    Gait: Narrow based with normal stride length and good arm swing bilaterally. Able to walk on heels, toes, and in tandem.       Assessment and Plan         Clonazepam as needed  Defers PEDRO scan right now         Follow up in about 6 months (around 6/20/2024).

## 2024-04-05 ENCOUNTER — CLINICAL SUPPORT (OUTPATIENT)
Dept: PRIMARY CARE CLINIC | Facility: CLINIC | Age: 69
End: 2024-04-05
Payer: MEDICARE

## 2024-04-05 DIAGNOSIS — Z00.00 WELLNESS EXAMINATION: ICD-10-CM

## 2024-04-05 DIAGNOSIS — E78.5 DYSLIPIDEMIA: ICD-10-CM

## 2024-04-05 DIAGNOSIS — Z00.00 ENCOUNTER FOR WELLNESS EXAMINATION IN ADULT: Primary | ICD-10-CM

## 2024-04-05 DIAGNOSIS — Z12.5 SCREENING FOR PROSTATE CANCER: ICD-10-CM

## 2024-04-05 DIAGNOSIS — E11.9 TYPE 2 DIABETES MELLITUS WITHOUT COMPLICATION, WITHOUT LONG-TERM CURRENT USE OF INSULIN: ICD-10-CM

## 2024-04-05 LAB
ALBUMIN SERPL-MCNC: 4 G/DL (ref 3.4–4.8)
ALBUMIN/GLOB SERPL: 1.4 RATIO (ref 1.1–2)
ALP SERPL-CCNC: 82 UNIT/L (ref 40–150)
ALT SERPL-CCNC: 19 UNIT/L (ref 0–55)
AST SERPL-CCNC: 26 UNIT/L (ref 5–34)
BASOPHILS # BLD AUTO: 0.06 X10(3)/MCL
BASOPHILS NFR BLD AUTO: 0.9 %
BILIRUB SERPL-MCNC: 0.4 MG/DL
BUN SERPL-MCNC: 14.9 MG/DL (ref 8.4–25.7)
CALCIUM SERPL-MCNC: 10.4 MG/DL (ref 8.8–10)
CHLORIDE SERPL-SCNC: 102 MMOL/L (ref 98–107)
CHOLEST SERPL-MCNC: 131 MG/DL
CHOLEST/HDLC SERPL: 3 {RATIO} (ref 0–5)
CO2 SERPL-SCNC: 28 MMOL/L (ref 23–31)
CREAT SERPL-MCNC: 0.79 MG/DL (ref 0.73–1.18)
CREAT UR-MCNC: 56.9 MG/DL (ref 63–166)
EOSINOPHIL # BLD AUTO: 0.62 X10(3)/MCL (ref 0–0.9)
EOSINOPHIL NFR BLD AUTO: 9.3 %
ERYTHROCYTE [DISTWIDTH] IN BLOOD BY AUTOMATED COUNT: 15 % (ref 11.5–17)
EST. AVERAGE GLUCOSE BLD GHB EST-MCNC: 122.6 MG/DL
GFR SERPLBLD CREATININE-BSD FMLA CKD-EPI: >60 MLS/MIN/1.73/M2
GLOBULIN SER-MCNC: 2.8 GM/DL (ref 2.4–3.5)
GLUCOSE SERPL-MCNC: 98 MG/DL (ref 82–115)
HBA1C MFR BLD: 5.9 %
HCT VFR BLD AUTO: 36.6 % (ref 42–52)
HDLC SERPL-MCNC: 52 MG/DL (ref 35–60)
HGB BLD-MCNC: 11.2 G/DL (ref 14–18)
IMM GRANULOCYTES # BLD AUTO: 0.04 X10(3)/MCL (ref 0–0.04)
IMM GRANULOCYTES NFR BLD AUTO: 0.6 %
LDLC SERPL CALC-MCNC: 66 MG/DL (ref 50–140)
LYMPHOCYTES # BLD AUTO: 1.25 X10(3)/MCL (ref 0.6–4.6)
LYMPHOCYTES NFR BLD AUTO: 18.8 %
MCH RBC QN AUTO: 27.1 PG (ref 27–31)
MCHC RBC AUTO-ENTMCNC: 30.6 G/DL (ref 33–36)
MCV RBC AUTO: 88.4 FL (ref 80–94)
MICROALBUMIN UR-MCNC: <5 UG/ML
MICROALBUMIN/CREAT RATIO PNL UR: ABNORMAL
MONOCYTES # BLD AUTO: 0.48 X10(3)/MCL (ref 0.1–1.3)
MONOCYTES NFR BLD AUTO: 7.2 %
NEUTROPHILS # BLD AUTO: 4.19 X10(3)/MCL (ref 2.1–9.2)
NEUTROPHILS NFR BLD AUTO: 63.2 %
NRBC BLD AUTO-RTO: 0 %
PLATELET # BLD AUTO: 415 X10(3)/MCL (ref 130–400)
PMV BLD AUTO: 11.4 FL (ref 7.4–10.4)
POTASSIUM SERPL-SCNC: 3.9 MMOL/L (ref 3.5–5.1)
PROT SERPL-MCNC: 6.8 GM/DL (ref 5.8–7.6)
PSA SERPL-MCNC: 1.86 NG/ML
RBC # BLD AUTO: 4.14 X10(6)/MCL (ref 4.7–6.1)
SODIUM SERPL-SCNC: 140 MMOL/L (ref 136–145)
TRIGL SERPL-MCNC: 65 MG/DL (ref 34–140)
TSH SERPL-ACNC: 1.14 UIU/ML (ref 0.35–4.94)
VLDLC SERPL CALC-MCNC: 13 MG/DL
WBC # SPEC AUTO: 6.64 X10(3)/MCL (ref 4.5–11.5)

## 2024-04-05 PROCEDURE — 83036 HEMOGLOBIN GLYCOSYLATED A1C: CPT | Performed by: GENERAL PRACTICE

## 2024-04-05 PROCEDURE — 80053 COMPREHEN METABOLIC PANEL: CPT | Performed by: GENERAL PRACTICE

## 2024-04-05 PROCEDURE — 82043 UR ALBUMIN QUANTITATIVE: CPT | Performed by: GENERAL PRACTICE

## 2024-04-05 PROCEDURE — 84153 ASSAY OF PSA TOTAL: CPT | Performed by: GENERAL PRACTICE

## 2024-04-05 PROCEDURE — 84443 ASSAY THYROID STIM HORMONE: CPT | Performed by: GENERAL PRACTICE

## 2024-04-05 PROCEDURE — 80061 LIPID PANEL: CPT | Performed by: GENERAL PRACTICE

## 2024-04-05 PROCEDURE — 85025 COMPLETE CBC W/AUTO DIFF WBC: CPT | Performed by: GENERAL PRACTICE

## 2024-04-05 PROCEDURE — 36415 COLL VENOUS BLD VENIPUNCTURE: CPT

## 2024-04-05 PROCEDURE — 36415 COLL VENOUS BLD VENIPUNCTURE: CPT | Mod: ,,, | Performed by: GENERAL PRACTICE

## 2024-04-08 NOTE — ASSESSMENT & PLAN NOTE
Component Ref Range & Units 3 d ago  (4/5/24) 1 yr ago  (4/3/23) 2 yr ago  (4/1/22) 3 yr ago  (1/28/21) 4 yr ago  (2/4/20) 4 yr ago  (4/16/19) 5 yr ago  (1/8/19)   Hemoglobin A1c <=7.0 % 5.9 6.1 5.5 R 5.6 R 6.3 R 5.9 R 6.6 High  R   Estimated Average Glucose mg/dL 122.6 128.4 111.2 R 114.0 134 123 143

## 2024-04-08 NOTE — ASSESSMENT & PLAN NOTE
Component Ref Range & Units 3 d ago  (4/5/24) 1 yr ago  (4/3/23) 2 yr ago  (4/1/22) 2 yr ago  (4/1/22) 3 yr ago  (1/28/21) 3 yr ago  (1/28/21) 4 yr ago  (2/4/20) 4 yr ago  (2/4/20)   WBC 4.50 - 11.50 x10(3)/mcL 6.64 7.0 R 7.8 R  7.1 R  6.6 R    RBC 4.70 - 6.10 x10(6)/mcL 4.14 Low  4.33 Low  4.62 R  4.57 Low   4.67 Low     Hgb 14.0 - 18.0 g/dL 11.2 Low  12.5 Low  13.7 R  13.6 Low  R  13.6 Low  R    Hct 42.0 - 52.0 % 36.6 Low  39.0 Low  42.5 R  43.5  44.2    MCV 80.0 - 94.0 fL 88.4 90.1 92.0 R  95.2 High   94.6 High     MCH 27.0 - 31.0 pg 27.1 28.9 29.7 R  29.8  29.1    MCHC 33.0 - 36.0 g/dL 30.6 Low  32.1 Low  32.2 R  31.3 Low  R  30.8 Low  R    RDW 11.5 - 17.0 % 15.0 13.8 13.8 R  13.7  13.3    Platelet 130 - 400 x10(3)/mcL 415 High  329 341 R  342  274

## 2024-04-08 NOTE — PROGRESS NOTES
Patient ID: [id number]     Chief Complaint:  Annual wellness    HPI:     Pito Garcia is a 68 y.o. male here today for a Medicare Wellness. No other complaints today.       ----------------------------  Anemia  Diabetes mellitus, type 2  Hypercholesterolemia  Hypertension     Past Surgical History:   Procedure Laterality Date    COLONOSCOPY      Dr High    HEMANGIOMA EXCISION      TONSILLECTOMY      VASECTOMY         Review of patient's allergies indicates:   Allergen Reactions    Hydrocodone Nausea And Vomiting     Other reaction(s): emesis    Hydrocodone-ibuprofen Nausea Only       Outpatient Medications Marked as Taking for the 4/9/24 encounter (Office Visit) with Scooter Michelle MD   Medication Sig Dispense Refill    amLODIPine (NORVASC) 5 MG tablet Take 1 tablet (5 mg total) by mouth once daily. 90 tablet 3    atorvastatin (LIPITOR) 40 MG tablet Take 1 tablet (40 mg total) by mouth once daily. 90 tablet 3    calcium carbonate (CALCIUM 600 ORAL) Take 1 tablet by mouth Daily.      clonazePAM (KLONOPIN) 1 MG tablet Take 1 tablet (1 mg total) by mouth every evening. 90 tablet 1    co-enzyme Q-10 30 mg capsule Take 30 mg by mouth once daily.      hydroCHLOROthiazide (HYDRODIURIL) 25 MG tablet Take 1 tablet (25 mg total) by mouth once daily. 90 tablet 3    lisinopriL 10 MG tablet Take 10 mg by mouth.      magnesium oxide 400 mg magnesium Cap Take 1 tablet by mouth Daily.      MEN'S MULTI-VITAMIN ORAL Take 1 tablet by mouth Daily.      mv-mn/iron/folic acid/herb 190 (VITAMIN D3 COMPLETE ORAL) Take by mouth.      omega-3 fatty acids 1,000 mg Cap Take 1 capsule by mouth Daily.      omeprazole (PRILOSEC) 20 MG capsule Take 20 mg by mouth.      sertraline (ZOLOFT) 100 MG tablet Take 1 tablet (100 mg total) by mouth once daily. 90 tablet 3       Social History     Socioeconomic History    Marital status:    Tobacco Use    Smoking status: Former     Current packs/day: 0.00     Types: Cigarettes     Quit date:  1975     Years since quittin.3    Smokeless tobacco: Never   Substance and Sexual Activity    Alcohol use: Yes     Comment: occasionally    Drug use: Not Currently        Family History   Problem Relation Age of Onset    Diabetes Mother     Stroke Mother         Patient Care Team:  Scooter Michelle MD as PCP - General (Family Medicine)  Felipe, Josemanuel DAS MD as Consulting Physician (Neurology)  Jason High MD as Consulting Physician (Gastroenterology)  Wilfred Dick Jr., MD as Consulting Physician (Physical Medicine and Rehabilitation)  Chastity Kathleen MD as Consulting Physician (Otolaryngology)     Opioid Screening: Patient medication list reviewed, patient is not taking prescription opioids. Patient is not using additional opioids than prescribed. Patient is at low risk of substance abuse based on this opioid use history.       Subjective:     Review of Systems   Constitutional: Negative.  Negative for malaise/fatigue and weight loss.   HENT: Negative.     Eyes: Negative.    Respiratory: Negative.  Negative for shortness of breath.    Cardiovascular: Negative.  Negative for chest pain.   Gastrointestinal: Negative.    Genitourinary: Negative.    Musculoskeletal:  Positive for neck pain.   Skin: Negative.    Neurological: Negative.  Negative for focal weakness, weakness and headaches.   Endo/Heme/Allergies: Negative.    Psychiatric/Behavioral: Negative.  Negative for depression and memory loss. The patient is not nervous/anxious.          Patient Reported Health Risk Assessment  What [age] your age?: 65-69  Are you male or female?: Male  During the past four weeks, how much have you been bothered by emotional problems such as feeling anxious, depressed, irritable, sad, or downhearted and blue?: Not at all  During the past five weeks, has your physical and/or emotional health limited your social activities with family, friends, neighbors, or groups?: Not at all  During the past four weeks, how much bodily  pain have you generally had?: No pain  During the past four weeks, was someone available to help if you needed and wanted help?: Yes, as much as I wanted  During the past four weeks, what was the hardest physical activity you could do for at least two minutes?: Light  Can you get to places out of walking distance without help?  (For example, can you travel alone on buses or taxis, or drive your own car?): Yes  Can you go shopping for groceries or clothes without someone's help?: Yes  Can you prepare your own meals?: Yes  Can you do your own housework without help?: Yes  Because of any health problems, do you need the help of another person with your personal care needs such as eating, bathing, dressing, or getting around the house?: No  Can you handle your own money without help?: Yes  During the past four weeks, how would you rate your health in general?: Good  How have things been going for you during the past four weeks?: Pretty well  Are you having difficulties driving your car?: No  Do you always fasten your seat belt when you are in a car?: Yes, usually  How often in the past four weeks have you been bothered by falling or dizzy when standing up?: Never  How often in the past four weeks have you been bothered by sexual problems?: Never  How often in the past four weeks have you been bothered by trouble eating well?: Never  How often in the past four weeks have you been bothered by teeth or denture problems?: Never  How often in the past four weeks have you been bothered with problems using the telephone?: Never  How often in the past four weeks have you been bothered by tiredness or fatigue?: Never  Have you fallen two or more times in the past year?: No  Are you afraid of falling?: No  Are you a smoker?: No  During the past four weeks, how many drinks of wine, beer, or other alcoholic beverages did you have?: One drink or less per week  Do you exercise for about 20 minutes three or more days a week?: No, I  "usually do not exercise this much  Have you been given any information to help you with hazards in your house that might hurt you?: No  Have you been given any information to help you with keeping track of your medications?: No  How often do you have trouble taking medicines the way you've been told to take them?: I always take them as prescribed  How confident are you that you can control and manage most of your health problems?: Very confident  What is your race? (Check all that apply.):     Objective:     /72   Pulse 74   Resp 18   Ht 5' 6" (1.676 m)   Wt 81.6 kg (180 lb)   SpO2 98%   BMI 29.05 kg/m²     Physical Exam  Constitutional:       Appearance: Normal appearance.   HENT:      Head: Normocephalic.      Mouth/Throat:      Mouth: Mucous membranes are moist.      Pharynx: Oropharynx is clear.   Eyes:      Conjunctiva/sclera: Conjunctivae normal.      Pupils: Pupils are equal, round, and reactive to light.   Cardiovascular:      Rate and Rhythm: Normal rate and regular rhythm.      Heart sounds: Normal heart sounds.   Pulmonary:      Effort: Pulmonary effort is normal.      Breath sounds: Normal breath sounds.   Abdominal:      General: Abdomen is flat.      Palpations: Abdomen is soft.   Musculoskeletal:         General: Normal range of motion.      Cervical back: Neck supple.   Skin:     General: Skin is warm and dry.   Neurological:      General: No focal deficit present.      Mental Status: He is alert and oriented to person, place, and time.   Psychiatric:         Mood and Affect: Mood normal.         Behavior: Behavior normal.         Thought Content: Thought content normal.         Judgment: Judgment normal.         Lab Results   Component Value Date     04/05/2024    K 3.9 04/05/2024    CO2 28 04/05/2024    BUN 14.9 04/05/2024    CREATININE 0.79 04/05/2024    CALCIUM 10.4 (H) 04/05/2024    ALBUMIN 4.0 04/05/2024    BILITOT 0.4 04/05/2024    ALKPHOS 82 04/05/2024    AST 26 " 04/05/2024    ALT 19 04/05/2024    EGFRNORACEVR >60 04/05/2024     Lab Results   Component Value Date    WBC 6.64 04/05/2024    RBC 4.14 (L) 04/05/2024    HGB 11.2 (L) 04/05/2024    HCT 36.6 (L) 04/05/2024    MCV 88.4 04/05/2024    RDW 15.0 04/05/2024     (H) 04/05/2024      Lab Results   Component Value Date    CHOL 131 04/05/2024    TRIG 65 04/05/2024    HDL 52 04/05/2024    LDL 66.00 04/05/2024    TOTALCHOLEST 3 04/05/2024     Lab Results   Component Value Date    TSH 1.137 04/05/2024     Lab Results   Component Value Date    HGBA1C 5.9 04/05/2024        Lab Results   Component Value Date    PSA 1.86 04/05/2024              No data to display                  4/9/2024    10:30 AM 12/20/2023    10:30 AM 9/6/2023     1:45 PM 6/28/2023     9:45 AM 4/5/2023     9:30 AM 12/28/2022     9:15 AM 10/3/2022     9:30 AM   Fall Risk Assessment - Outpatient   Mobility Status Ambulatory Ambulatory Ambulatory Ambulatory Ambulatory Ambulatory Ambulatory   Number of falls 0 0 0 0 0 1 0   Identified as fall risk False False False False False False False           Depression Screening  Over the past two weeks, has the patient felt down, depressed, or hopeless?: No  Over the past two weeks, has the patient felt little interest or pleasure in doing things?: No  Functional Ability/Safety Screening  Was the patient's timed Up & Go test unsteady or longer than 30 seconds?: No  Does the patient need help with phone, transportation, shopping, preparing meals, housework, laundry, meds, or managing money?: No  Does the patient's home have rugs in the hallway, lack grab bars in the bathroom, lack handrails on the stairs or have poor lighting?: No  Have you noticed any hearing difficulties?: No  Cognitive Function (Assessed through direct observation with due consideration of information obtained by way of patient reports and/or concerns raised by family, friends, caretakers, or others)    Does the patient repeat questions/statements  in the same day?: No  Does the patient have trouble remembering the date, year, and time?: No  Does the patient have difficulty managing finances?: No  Does the patient have a decreased sense of direction?: No  Assessment:       ICD-10-CM ICD-9-CM   1. Medicare annual wellness visit, subsequent  Z00.00 V70.0   2. Primary hypertension  I10 401.9   3. Type 2 diabetes mellitus without complication, without long-term current use of insulin  E11.9 250.00   4. Dyslipidemia  E78.5 272.4   5. Neck pain  M54.2 723.1   6. JOE (generalized anxiety disorder)  F41.1 300.02   7. Anemia, unspecified type  D64.9 285.9   8. Overweight (BMI 25.0-29.9)  E66.3 278.02        Plan:     Medicare Annual Wellness and Personalized Prevention Plan:   Fall Risk + Home Safety + Hearing Impairment + Depression Screen + Cognitive Impairment Screen + Health Risk Assessment all reviewed.       Health Maintenance Topics with due status: Not Due       Topic Last Completion Date    Colorectal Cancer Screening 02/21/2023    Eye Exam 10/26/2023    Low Dose Statin 12/20/2023    Diabetes Urine Screening 04/05/2024    Lipid Panel 04/05/2024    Hemoglobin A1c 04/05/2024      The patient's Health Maintenance was reviewed and the following appears to be due at this time:   Health Maintenance Due   Topic Date Due    TETANUS VACCINE  Never done    Shingles Vaccine (1 of 2) Never done    RSV Vaccine (Age 60+ and Pregnant patients) (1 - 1-dose 60+ series) Never done    Abdominal Aortic Aneurysm Screening  Never done    Influenza Vaccine (1) 09/01/2023    COVID-19 Vaccine (8 - 2023-24 season) 09/01/2023    Foot Exam  04/05/2024     Health risk assessment:  After review of the patient's medical record as it relates to health risk assessment over the next 5-10 years per recommendations of the USPSTF, it was determined that all pertinent recommendations have been appropriately addressed. The patient does not desire any further preventative care measures or screening  tests at this time.  We will continue to reassess at each annual visit.    1. Medicare annual wellness visit, subsequent    2. Primary hypertension  Overview:  Prescribed lisinopril 10 mg daily amlodipine 5 mg daily, hydrochlorothiazide 25 mg daily.    Blood pressures appear to be adequately controlled with current treatment plan, including prescription blood pressure medication. Continue medical management and continue home blood pressure checks.  Blood pressure should be checked as discussed during medical visits, and average blood pressure should be no greater than 130/80.      3. Type 2 diabetes mellitus without complication, without long-term current use of insulin  Overview:  Prescribed metformin 1000 mg b.i.d..    Most recent hemoglobin A1c shows good control on diabetic treatment plan, including diabetic prescription medication.  Continue current treatment plan, medical regimen, and lifestyle modification as discussed at this visit and during previous visits.    Assessment & Plan:  Component Ref Range & Units 3 d ago  (4/5/24) 1 yr ago  (4/3/23) 2 yr ago  (4/1/22) 3 yr ago  (1/28/21) 4 yr ago  (2/4/20) 4 yr ago  (4/16/19) 5 yr ago  (1/8/19)   Hemoglobin A1c <=7.0 % 5.9 6.1 5.5 R 5.6 R 6.3 R 5.9 R 6.6 High  R   Estimated Average Glucose mg/dL 122.6 128.4 111.2 R 114.0 134 123 143         4. Dyslipidemia  Overview:  Prescribed Lipitor 40 mg daily.    Most recent cholesterol/lipid blood testing shows adequate control, continue current treatment plan, including prescription medications if prescribed, and/or diet high in fiber, low in saturated fats as discussed during clinic visit.      5. Neck pain  Overview:  Chronic neck pain and spasm, muscular in nature.  Prescribed tizanidine 4 mg t.i.d. p.r.n. neck pain and spasm.    Orders:  -     tiZANidine (ZANAFLEX) 4 MG tablet; Take 1 tablet (4 mg total) by mouth every 6 (six) hours as needed (Neck pain).  Dispense: 30 tablet; Refill: 5    6. JOE (generalized anxiety  disorder)  Overview:  Prescribed sertraline 100 mg daily.      7. Anemia, unspecified type  Overview:  Mild persistent normocytic anemia, cause uncertain.  Monitored annually.  Anemia studies will be obtained prior to his next visit in six months.    Assessment & Plan:              Component Ref Range & Units 3 d ago  (4/5/24) 1 yr ago  (4/3/23) 2 yr ago  (4/1/22) 2 yr ago  (4/1/22) 3 yr ago  (1/28/21) 3 yr ago  (1/28/21) 4 yr ago  (2/4/20) 4 yr ago  (2/4/20)   WBC 4.50 - 11.50 x10(3)/mcL 6.64 7.0 R 7.8 R  7.1 R  6.6 R    RBC 4.70 - 6.10 x10(6)/mcL 4.14 Low  4.33 Low  4.62 R  4.57 Low   4.67 Low     Hgb 14.0 - 18.0 g/dL 11.2 Low  12.5 Low  13.7 R  13.6 Low  R  13.6 Low  R    Hct 42.0 - 52.0 % 36.6 Low  39.0 Low  42.5 R  43.5  44.2    MCV 80.0 - 94.0 fL 88.4 90.1 92.0 R  95.2 High   94.6 High     MCH 27.0 - 31.0 pg 27.1 28.9 29.7 R  29.8  29.1    MCHC 33.0 - 36.0 g/dL 30.6 Low  32.1 Low  32.2 R  31.3 Low  R  30.8 Low  R    RDW 11.5 - 17.0 % 15.0 13.8 13.8 R  13.7  13.3    Platelet 130 - 400 x10(3)/mcL 415 High  329 341 R  342  274           Orders:  -     CBC Auto Differential; Future; Expected date: 04/09/2024  -     Iron and TIBC; Future; Expected date: 04/09/2024  -     Ferritin; Future; Expected date: 04/09/2024  -     Reticulocytes; Future; Expected date: 04/09/2024  -     Folate; Future; Expected date: 04/09/2024  -     Vitamin B12; Future; Expected date: 04/09/2024  -     Path Review, Peripheral Smear; Future; Expected date: 04/09/2024    8. Overweight (BMI 25.0-29.9)  Overview:  Weight loss, healthy dietary choices, increased activity and exercise are all recommended.              Advance Care Planning     Date: 04/08/2024    Living Will  During this visit, I engaged the patient  in the voluntary advance care planning process.  The patient and I reviewed the role for advance directives and their purpose in directing future healthcare if the patient's unable to speak for him/herself.  At this point in time,  the patient does have full decision-making capacity.  We discussed different extreme health states that he could experience, and reviewed what kind of medical care he would want in those situations.  The patient communicated that if he were comatose and had little chance of a meaningful recovery, he would not want machines/life-sustaining treatments used. In addition to the above preference, other important end-of-life issues for the patient include no other issues. The patient has completed a living will to reflect these preferences.  I spent a total of five minutes engaging the patient in this advance care planning discussion.              Current Outpatient Medications   Medication Instructions    amLODIPine (NORVASC) 5 mg, Oral, Daily    atorvastatin (LIPITOR) 40 mg, Oral, Daily    calcium carbonate (CALCIUM 600 ORAL) 1 tablet, Oral, Daily    clonazePAM (KLONOPIN) 1 mg, Oral, Nightly    co-enzyme Q-10 30 mg, Oral, Daily    hydroCHLOROthiazide (HYDRODIURIL) 25 mg, Oral, Daily    lisinopriL 10 mg, Oral    magnesium oxide 400 mg magnesium Cap 1 tablet, Oral, Daily    MEN'S MULTI-VITAMIN ORAL 1 tablet, Oral, Daily    metFORMIN (GLUCOPHAGE) 1,000 mg, Oral, 2 times daily    mv-mn/iron/folic acid/herb 190 (VITAMIN D3 COMPLETE ORAL) Oral    omega-3 fatty acids 1,000 mg Cap 1 capsule, Oral, Daily    omeprazole (PRILOSEC) 20 mg, Oral    sertraline (ZOLOFT) 100 mg, Oral, Daily    tiZANidine (ZANAFLEX) 4 mg, Oral, Every 6 hours PRN        Follow up in about 6 months (around 10/10/2024) for Extended chronic condition F/up. In addition to their scheduled follow up, the patient has also been instructed to follow up on as needed basis.

## 2024-04-09 ENCOUNTER — OFFICE VISIT (OUTPATIENT)
Dept: PRIMARY CARE CLINIC | Facility: CLINIC | Age: 69
End: 2024-04-09
Payer: MEDICARE

## 2024-04-09 VITALS
HEIGHT: 66 IN | RESPIRATION RATE: 18 BRPM | WEIGHT: 180 LBS | BODY MASS INDEX: 28.93 KG/M2 | OXYGEN SATURATION: 98 % | SYSTOLIC BLOOD PRESSURE: 136 MMHG | HEART RATE: 74 BPM | DIASTOLIC BLOOD PRESSURE: 72 MMHG

## 2024-04-09 DIAGNOSIS — F41.1 GAD (GENERALIZED ANXIETY DISORDER): Chronic | ICD-10-CM

## 2024-04-09 DIAGNOSIS — Z00.00 MEDICARE ANNUAL WELLNESS VISIT, SUBSEQUENT: Primary | ICD-10-CM

## 2024-04-09 DIAGNOSIS — I10 PRIMARY HYPERTENSION: Chronic | ICD-10-CM

## 2024-04-09 DIAGNOSIS — E66.3 OVERWEIGHT (BMI 25.0-29.9): Chronic | ICD-10-CM

## 2024-04-09 DIAGNOSIS — D64.9 ANEMIA, UNSPECIFIED TYPE: Chronic | ICD-10-CM

## 2024-04-09 DIAGNOSIS — E78.5 DYSLIPIDEMIA: Chronic | ICD-10-CM

## 2024-04-09 DIAGNOSIS — E11.9 TYPE 2 DIABETES MELLITUS WITHOUT COMPLICATION, WITHOUT LONG-TERM CURRENT USE OF INSULIN: Chronic | ICD-10-CM

## 2024-04-09 DIAGNOSIS — M54.2 NECK PAIN: Chronic | ICD-10-CM

## 2024-04-09 PROCEDURE — 3075F SYST BP GE 130 - 139MM HG: CPT | Mod: CPTII,,, | Performed by: GENERAL PRACTICE

## 2024-04-09 PROCEDURE — 1101F PT FALLS ASSESS-DOCD LE1/YR: CPT | Mod: CPTII,,, | Performed by: GENERAL PRACTICE

## 2024-04-09 PROCEDURE — 1159F MED LIST DOCD IN RCRD: CPT | Mod: CPTII,,, | Performed by: GENERAL PRACTICE

## 2024-04-09 PROCEDURE — 3078F DIAST BP <80 MM HG: CPT | Mod: CPTII,,, | Performed by: GENERAL PRACTICE

## 2024-04-09 PROCEDURE — 3061F NEG MICROALBUMINURIA REV: CPT | Mod: CPTII,,, | Performed by: GENERAL PRACTICE

## 2024-04-09 PROCEDURE — 1160F RVW MEDS BY RX/DR IN RCRD: CPT | Mod: CPTII,,, | Performed by: GENERAL PRACTICE

## 2024-04-09 PROCEDURE — 3288F FALL RISK ASSESSMENT DOCD: CPT | Mod: CPTII,,, | Performed by: GENERAL PRACTICE

## 2024-04-09 PROCEDURE — 3044F HG A1C LEVEL LT 7.0%: CPT | Mod: CPTII,,, | Performed by: GENERAL PRACTICE

## 2024-04-09 PROCEDURE — 3066F NEPHROPATHY DOC TX: CPT | Mod: CPTII,,, | Performed by: GENERAL PRACTICE

## 2024-04-09 PROCEDURE — G0439 PPPS, SUBSEQ VISIT: HCPCS | Mod: ,,, | Performed by: GENERAL PRACTICE

## 2024-04-09 PROCEDURE — 1123F ACP DISCUSS/DSCN MKR DOCD: CPT | Mod: CPTII,,, | Performed by: GENERAL PRACTICE

## 2024-04-09 RX ORDER — TIZANIDINE 4 MG/1
4 TABLET ORAL EVERY 6 HOURS PRN
Qty: 30 TABLET | Refills: 5 | Status: SHIPPED | OUTPATIENT
Start: 2024-04-09 | End: 2024-10-06

## 2024-04-15 DIAGNOSIS — E11.9 TYPE 2 DIABETES MELLITUS WITHOUT COMPLICATION, WITHOUT LONG-TERM CURRENT USE OF INSULIN: ICD-10-CM

## 2024-04-15 RX ORDER — METFORMIN HYDROCHLORIDE 1000 MG/1
1000 TABLET ORAL 2 TIMES DAILY
Qty: 180 TABLET | Refills: 3 | Status: SHIPPED | OUTPATIENT
Start: 2024-04-15 | End: 2025-04-15

## 2024-05-29 ENCOUNTER — OFFICE VISIT (OUTPATIENT)
Dept: URGENT CARE | Facility: CLINIC | Age: 69
End: 2024-05-29
Payer: MEDICARE

## 2024-05-29 VITALS
WEIGHT: 171 LBS | BODY MASS INDEX: 27.48 KG/M2 | DIASTOLIC BLOOD PRESSURE: 76 MMHG | HEIGHT: 66 IN | SYSTOLIC BLOOD PRESSURE: 117 MMHG | TEMPERATURE: 98 F | RESPIRATION RATE: 18 BRPM | OXYGEN SATURATION: 97 % | HEART RATE: 80 BPM

## 2024-05-29 DIAGNOSIS — J20.9 ACUTE BRONCHITIS, UNSPECIFIED ORGANISM: Primary | ICD-10-CM

## 2024-05-29 PROCEDURE — 99203 OFFICE O/P NEW LOW 30 MIN: CPT | Mod: 25,,, | Performed by: PHYSICIAN ASSISTANT

## 2024-05-29 PROCEDURE — 96372 THER/PROPH/DIAG INJ SC/IM: CPT | Mod: ,,, | Performed by: PHYSICIAN ASSISTANT

## 2024-05-29 RX ORDER — DOXYCYCLINE 100 MG/1
100 CAPSULE ORAL EVERY 12 HOURS
Qty: 20 CAPSULE | Refills: 0 | Status: SHIPPED | OUTPATIENT
Start: 2024-05-29 | End: 2024-06-08

## 2024-05-29 RX ORDER — BETAMETHASONE SODIUM PHOSPHATE AND BETAMETHASONE ACETATE 3; 3 MG/ML; MG/ML
6 INJECTION, SUSPENSION INTRA-ARTICULAR; INTRALESIONAL; INTRAMUSCULAR; SOFT TISSUE
Status: COMPLETED | OUTPATIENT
Start: 2024-05-29 | End: 2024-05-29

## 2024-05-29 RX ADMIN — BETAMETHASONE SODIUM PHOSPHATE AND BETAMETHASONE ACETATE 6 MG: 3; 3 INJECTION, SUSPENSION INTRA-ARTICULAR; INTRALESIONAL; INTRAMUSCULAR; SOFT TISSUE at 10:05

## 2024-05-29 NOTE — PROGRESS NOTES
"Subjective:      Patient ID: Pito Garcia is a 69 y.o. male.    Vitals:  height is 5' 6" (1.676 m) and weight is 77.6 kg (171 lb). His tympanic temperature is 97.7 °F (36.5 °C). His blood pressure is 117/76 and his pulse is 80. His respiration is 18 and oxygen saturation is 97%.     Chief Complaint: Cough     Patient is a 69 y.o. male who presents to urgent care with complaints of a cough x6 days. Patient denies fever, N/V/D, sore throat, ear discomfort.      ROS   Objective:     Physical Exam   Constitutional: He is oriented to person, place, and time. He appears well-developed. He is cooperative.  Non-toxic appearance. He does not appear ill. No distress.   HENT:   Head: Normocephalic and atraumatic.   Ears:   Right Ear: Hearing, tympanic membrane, external ear and ear canal normal.   Left Ear: Hearing, tympanic membrane, external ear and ear canal normal.   Nose: Nose normal. No nasal deformity. No epistaxis.   Mouth/Throat: Uvula is midline, oropharynx is clear and moist and mucous membranes are normal. No trismus in the jaw. Normal dentition. No uvula swelling. No oropharyngeal exudate, posterior oropharyngeal edema or posterior oropharyngeal erythema.   Eyes: Conjunctivae and lids are normal. No scleral icterus.   Neck: Trachea normal and phonation normal. Neck supple. No edema present. No erythema present. No neck rigidity present.   Cardiovascular: Normal rate, regular rhythm, normal heart sounds and normal pulses.   Pulmonary/Chest: Effort normal and breath sounds normal. No respiratory distress. He has no decreased breath sounds. He has no rhonchi.   Abdominal: Normal appearance.   Musculoskeletal: Normal range of motion.         General: No deformity. Normal range of motion.   Neurological: He is alert and oriented to person, place, and time. He exhibits normal muscle tone. Coordination normal.   Skin: Skin is warm, dry, intact, not diaphoretic and not pale.   Psychiatric: His speech is normal and " "behavior is normal. Judgment and thought content normal.   Nursing note and vitals reviewed.       Previous History      Review of patient's allergies indicates:   Allergen Reactions    Hydrocodone Nausea And Vomiting     Other reaction(s): emesis    Hydrocodone-ibuprofen Nausea Only       Past Medical History:   Diagnosis Date    Anemia     Diabetes mellitus, type 2     Hypercholesterolemia     Hypertension      Current Outpatient Medications   Medication Instructions    amLODIPine (NORVASC) 5 mg, Oral, Daily    atorvastatin (LIPITOR) 40 mg, Oral, Daily    calcium carbonate (CALCIUM 600 ORAL) 1 tablet, Oral, Daily    clonazePAM (KLONOPIN) 1 mg, Oral, Nightly    co-enzyme Q-10 30 mg, Oral, Daily    doxycycline (MONODOX) 100 mg, Oral, Every 12 hours    hydroCHLOROthiazide (HYDRODIURIL) 25 mg, Oral, Daily    lisinopriL 10 mg, Oral    magnesium oxide 400 mg magnesium Cap 1 tablet, Oral, Daily    MEN'S MULTI-VITAMIN ORAL 1 tablet, Oral, Daily    metFORMIN (GLUCOPHAGE) 1,000 mg, Oral, 2 times daily    mv-mn/iron/folic acid/herb 190 (VITAMIN D3 COMPLETE ORAL) Oral    omega-3 fatty acids 1,000 mg Cap 1 capsule, Oral, Daily    omeprazole (PRILOSEC) 20 mg, Oral    sertraline (ZOLOFT) 100 mg, Oral, Daily    tiZANidine (ZANAFLEX) 4 mg, Oral, Every 6 hours PRN     Past Surgical History:   Procedure Laterality Date    COLONOSCOPY      Dr High    HEMANGIOMA EXCISION      TONSILLECTOMY      VASECTOMY       Family History   Problem Relation Name Age of Onset    Diabetes Mother      Stroke Mother         Social History     Tobacco Use    Smoking status: Former     Current packs/day: 0.00     Types: Cigarettes     Quit date:      Years since quittin.4    Smokeless tobacco: Never   Substance Use Topics    Alcohol use: Yes     Comment: occasionally    Drug use: Not Currently        Physical Exam      Vital Signs Reviewed   /76   Pulse 80   Temp 97.7 °F (36.5 °C) (Tympanic)   Resp 18   Ht 5' 6" (1.676 m)   Wt 77.6 " kg (171 lb)   SpO2 97%   BMI 27.60 kg/m²        Procedures    Procedures     Labs     Results for orders placed or performed in visit on 04/05/24   Lipid Panel   Result Value Ref Range    Cholesterol Total 131 <=200 mg/dL    HDL Cholesterol 52 35 - 60 mg/dL    Triglyceride 65 34 - 140 mg/dL    Cholesterol/HDL Ratio 3 0 - 5    Very Low Density Lipoprotein 13     LDL Cholesterol 66.00 50.00 - 140.00 mg/dL   Comprehensive Metabolic Panel   Result Value Ref Range    Sodium 140 136 - 145 mmol/L    Potassium 3.9 3.5 - 5.1 mmol/L    Chloride 102 98 - 107 mmol/L    CO2 28 23 - 31 mmol/L    Glucose 98 82 - 115 mg/dL    Blood Urea Nitrogen 14.9 8.4 - 25.7 mg/dL    Creatinine 0.79 0.73 - 1.18 mg/dL    Calcium 10.4 (H) 8.8 - 10.0 mg/dL    Protein Total 6.8 5.8 - 7.6 gm/dL    Albumin 4.0 3.4 - 4.8 g/dL    Globulin 2.8 2.4 - 3.5 gm/dL    Albumin/Globulin Ratio 1.4 1.1 - 2.0 ratio    Bilirubin Total 0.4 <=1.5 mg/dL    ALP 82 40 - 150 unit/L    ALT 19 0 - 55 unit/L    AST 26 5 - 34 unit/L    eGFR >60 mls/min/1.73/m2   Microalbumin/Creatinine Ratio, Urine   Result Value Ref Range    Urine Microalbumin <5.0 <=30.0 ug/ml    Urine Creatinine 56.9 (L) 63.0 - 166.0 mg/dL    Microalbumin Creatinine Ratio     PSA, Screening   Result Value Ref Range    Prostate Specific Antigen 1.86 <=4.00 ng/mL   TSH   Result Value Ref Range    TSH 1.137 0.350 - 4.940 uIU/mL   Hemoglobin A1C   Result Value Ref Range    Hemoglobin A1c 5.9 <=7.0 %    Estimated Average Glucose 122.6 mg/dL   CBC with Differential   Result Value Ref Range    WBC 6.64 4.50 - 11.50 x10(3)/mcL    RBC 4.14 (L) 4.70 - 6.10 x10(6)/mcL    Hgb 11.2 (L) 14.0 - 18.0 g/dL    Hct 36.6 (L) 42.0 - 52.0 %    MCV 88.4 80.0 - 94.0 fL    MCH 27.1 27.0 - 31.0 pg    MCHC 30.6 (L) 33.0 - 36.0 g/dL    RDW 15.0 11.5 - 17.0 %    Platelet 415 (H) 130 - 400 x10(3)/mcL    MPV 11.4 (H) 7.4 - 10.4 fL    Neut % 63.2 %    Lymph % 18.8 %    Mono % 7.2 %    Eos % 9.3 %    Basophil % 0.9 %    Lymph # 1.25 0.6  - 4.6 x10(3)/mcL    Neut # 4.19 2.1 - 9.2 x10(3)/mcL    Mono # 0.48 0.1 - 1.3 x10(3)/mcL    Eos # 0.62 0 - 0.9 x10(3)/mcL    Baso # 0.06 <=0.2 x10(3)/mcL    IG# 0.04 0 - 0.04 x10(3)/mcL    IG% 0.6 %    NRBC% 0.0 %       Assessment:     1. Acute bronchitis, unspecified organism        Plan:       Acute bronchitis, unspecified organism    Other orders  -     betamethasone acetate-betamethasone sodium phosphate injection 6 mg  -     doxycycline (MONODOX) 100 MG capsule; Take 1 capsule (100 mg total) by mouth every 12 (twelve) hours. for 10 days  Dispense: 20 capsule; Refill: 0      Drink plenty of fluids.     Get plenty of rest.     Go to the ER with any significant change or worsening of symptoms.     Follow up with your primary care doctor.

## 2024-05-30 DIAGNOSIS — I10 HYPERTENSION, UNSPECIFIED TYPE: ICD-10-CM

## 2024-05-30 RX ORDER — HYDROCHLOROTHIAZIDE 25 MG/1
25 TABLET ORAL DAILY
Qty: 90 TABLET | Refills: 3 | Status: SHIPPED | OUTPATIENT
Start: 2024-05-30

## 2024-06-19 ENCOUNTER — OFFICE VISIT (OUTPATIENT)
Dept: NEUROLOGY | Facility: CLINIC | Age: 69
End: 2024-06-19
Payer: MEDICARE

## 2024-06-19 VITALS
WEIGHT: 168 LBS | SYSTOLIC BLOOD PRESSURE: 116 MMHG | DIASTOLIC BLOOD PRESSURE: 78 MMHG | HEIGHT: 66 IN | BODY MASS INDEX: 27 KG/M2

## 2024-06-19 DIAGNOSIS — G47.52 REM SLEEP BEHAVIOR DISORDER: Primary | ICD-10-CM

## 2024-06-19 PROCEDURE — 1159F MED LIST DOCD IN RCRD: CPT | Mod: CPTII,S$GLB,, | Performed by: PSYCHIATRY & NEUROLOGY

## 2024-06-19 PROCEDURE — 3061F NEG MICROALBUMINURIA REV: CPT | Mod: CPTII,S$GLB,, | Performed by: PSYCHIATRY & NEUROLOGY

## 2024-06-19 PROCEDURE — 3044F HG A1C LEVEL LT 7.0%: CPT | Mod: CPTII,S$GLB,, | Performed by: PSYCHIATRY & NEUROLOGY

## 2024-06-19 PROCEDURE — 3074F SYST BP LT 130 MM HG: CPT | Mod: CPTII,S$GLB,, | Performed by: PSYCHIATRY & NEUROLOGY

## 2024-06-19 PROCEDURE — 3288F FALL RISK ASSESSMENT DOCD: CPT | Mod: CPTII,S$GLB,, | Performed by: PSYCHIATRY & NEUROLOGY

## 2024-06-19 PROCEDURE — 99212 OFFICE O/P EST SF 10 MIN: CPT | Mod: S$GLB,,, | Performed by: PSYCHIATRY & NEUROLOGY

## 2024-06-19 PROCEDURE — 3008F BODY MASS INDEX DOCD: CPT | Mod: CPTII,S$GLB,, | Performed by: PSYCHIATRY & NEUROLOGY

## 2024-06-19 PROCEDURE — 99999 PR PBB SHADOW E&M-EST. PATIENT-LVL III: CPT | Mod: PBBFAC,,, | Performed by: PSYCHIATRY & NEUROLOGY

## 2024-06-19 PROCEDURE — 3078F DIAST BP <80 MM HG: CPT | Mod: CPTII,S$GLB,, | Performed by: PSYCHIATRY & NEUROLOGY

## 2024-06-19 PROCEDURE — 3066F NEPHROPATHY DOC TX: CPT | Mod: CPTII,S$GLB,, | Performed by: PSYCHIATRY & NEUROLOGY

## 2024-06-19 PROCEDURE — 1101F PT FALLS ASSESS-DOCD LE1/YR: CPT | Mod: CPTII,S$GLB,, | Performed by: PSYCHIATRY & NEUROLOGY

## 2024-06-19 PROCEDURE — 1125F AMNT PAIN NOTED PAIN PRSNT: CPT | Mod: CPTII,S$GLB,, | Performed by: PSYCHIATRY & NEUROLOGY

## 2024-06-19 RX ORDER — CLONAZEPAM 1 MG/1
1 TABLET ORAL NIGHTLY
Qty: 90 TABLET | Refills: 1 | Status: SHIPPED | OUTPATIENT
Start: 2024-06-19

## 2024-06-19 NOTE — PROGRESS NOTES
Subjective     Patient ID: Pito Garcia is a 69 y.o. male.    Chief Complaint: REM sleep behavior disorder    HPI  Review of Systems   All other systems reviewed and are negative.     68 yo M established patient here for f/u on REM sleep behavior disorder.   Has been doing well, no new sx such as issues with memory or hallucinations. Takes clonazepam 1mg nightly. Pt states his wife noticed his sx have been less and less; has not acted out dreams.      Objective     Physical Exam  Constitutional:       General: He is not in acute distress.  Eyes:      Extraocular Movements: Extraocular movements intact.   Neurological:      General: No focal deficit present.      Mental Status: He is alert.      Coordination: Coordination normal.      Gait: Gait normal.   Psychiatric:         Mood and Affect: Mood normal.         Behavior: Behavior normal.         Thought Content: Thought content normal.                Assessment and Plan     1. REM sleep behavior disorder    Other orders  -     clonazePAM (KLONOPIN) 1 MG tablet; Take 1 tablet (1 mg total) by mouth every evening.  Dispense: 90 tablet; Refill: 1      Stable, continue Klonopin 1mg nightly. Refills sent         Follow up in about 6 months (around 12/19/2024).

## 2024-08-05 DIAGNOSIS — M54.2 CERVICALGIA: Primary | ICD-10-CM

## 2024-08-08 ENCOUNTER — TELEPHONE (OUTPATIENT)
Dept: NEUROSURGERY | Facility: CLINIC | Age: 69
End: 2024-08-08
Payer: MEDICARE

## 2024-08-08 NOTE — TELEPHONE ENCOUNTER
The patient is being referred to Dr. Medina by Dr. Dick for cervicalgia.  Per Dr. Dick's office note on 7/25/24 he mentions the patient complains of neck pain and headaches.  He is referring him here verus injection therapy due to the significant narrowing around the spinal cord. I am waiting for the MRI cervical images from 7/10 /24 to be sent over from [facility] for review.

## 2024-08-09 NOTE — TELEPHONE ENCOUNTER
Please schedule the patient sooner rather than later on either my or Trinidad's schedule. Please have the patient complete AP/Lat/Flex/Ext views of the cervical spine prior to the visit. Please try to schedule this visit on a day when Dr. Medina will be in clinic. Thanks

## 2024-08-12 ENCOUNTER — TELEPHONE (OUTPATIENT)
Dept: PRIMARY CARE CLINIC | Facility: CLINIC | Age: 69
End: 2024-08-12
Payer: MEDICARE

## 2024-08-12 DIAGNOSIS — M54.2 CERVICALGIA: Primary | ICD-10-CM

## 2024-08-12 DIAGNOSIS — E78.5 DYSLIPIDEMIA: Primary | Chronic | ICD-10-CM

## 2024-08-12 RX ORDER — ATORVASTATIN CALCIUM 40 MG/1
40 TABLET, FILM COATED ORAL DAILY
Qty: 90 TABLET | Refills: 3 | Status: SHIPPED | OUTPATIENT
Start: 2024-08-12 | End: 2025-08-12

## 2024-08-12 NOTE — TELEPHONE ENCOUNTER
I REMY for the patient to schedule.  I was going to offer him 8/28/24 at 9:00 with Caty since Dr. Medina will be in that day.  Also need to ask history and confirm current symptoms.  BH

## 2024-08-12 NOTE — TELEPHONE ENCOUNTER
----- Message from Vandana Edge sent at 8/12/2024  8:55 AM CDT -----  Regarding: med refill  .Who Called: Pito Garcia    Refill or New Rx:Refill  RX Name and Strength:atorvastatin (LIPITOR) 40 MG tablet  How is the patient currently taking it? (ex. 1XDay):1xday  Is this a 30 day or 90 day RX:90  Local or Mail Order:local  List of preferred pharmacies on file (remove unneeded): [facility] - [place], [place] - [address]   Phone: [phone]  Fax: [phone]        Ordering Provider:Miguel Angel      Preferred Method of Contact: Phone Call  Patient's Preferred Phone Number on File: [phone]   Best Call Back Number, if different:  Additional Information: pt needs authorization from  to refill prescription

## 2024-08-12 NOTE — TELEPHONE ENCOUNTER
I spoke to the patient.  He is scheduled to see Caty on 8/28/24 at 9:00 on a day Dr. Medina will be in the office. I scheduled his XR at [facility] prior.  He denies any prior surgeries on his spine or brain.  He has only seen Dr. Ruiz before for his shoulder.
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
## 2024-08-27 RX ORDER — ACETAMINOPHEN AND CODEINE PHOSPHATE 300; 60 MG/1; MG/1
1 TABLET ORAL EVERY 8 HOURS PRN
COMMUNITY
Start: 2024-07-03 | End: 2024-08-28

## 2024-08-27 RX ORDER — MELOXICAM 15 MG/1
15 TABLET ORAL
COMMUNITY
Start: 2024-07-02 | End: 2024-08-28

## 2024-08-27 RX ORDER — AMOXICILLIN AND CLAVULANATE POTASSIUM 875; 125 MG/1; MG/1
1 TABLET, FILM COATED ORAL 2 TIMES DAILY
COMMUNITY
Start: 2024-03-06 | End: 2024-08-28

## 2024-08-27 RX ORDER — SODIUM CHLORIDE 0.9 G/100ML
IRRIGANT IRRIGATION
COMMUNITY
End: 2024-08-28

## 2024-08-28 ENCOUNTER — HOSPITAL ENCOUNTER (OUTPATIENT)
Dept: RADIOLOGY | Facility: HOSPITAL | Age: 69
Discharge: HOME | End: 2024-08-28
Attending: NURSE PRACTITIONER
Payer: MEDICARE

## 2024-08-28 ENCOUNTER — OFFICE VISIT (OUTPATIENT)
Dept: NEUROSURGERY | Facility: CLINIC | Age: 69
End: 2024-08-28
Payer: MEDICARE

## 2024-08-28 VITALS
BODY MASS INDEX: 28.93 KG/M2 | DIASTOLIC BLOOD PRESSURE: 77 MMHG | HEART RATE: 73 BPM | RESPIRATION RATE: 16 BRPM | HEIGHT: 66 IN | SYSTOLIC BLOOD PRESSURE: 135 MMHG | WEIGHT: 180 LBS

## 2024-08-28 DIAGNOSIS — M54.2 CERVICALGIA: ICD-10-CM

## 2024-08-28 DIAGNOSIS — M47.812 MULTILEVEL CERVICAL SPONDYLOSIS WITHOUT MYELOPATHY: Primary | ICD-10-CM

## 2024-08-28 DIAGNOSIS — M48.02 CERVICAL STENOSIS OF SPINAL CANAL: ICD-10-CM

## 2024-08-28 DIAGNOSIS — M47.816 LUMBAR SPONDYLOSIS: ICD-10-CM

## 2024-08-28 PROCEDURE — 1125F AMNT PAIN NOTED PAIN PRSNT: CPT | Mod: CPTII,,, | Performed by: NURSE PRACTITIONER

## 2024-08-28 PROCEDURE — 3078F DIAST BP <80 MM HG: CPT | Mod: CPTII,,, | Performed by: NURSE PRACTITIONER

## 2024-08-28 PROCEDURE — 3008F BODY MASS INDEX DOCD: CPT | Mod: CPTII,,, | Performed by: NURSE PRACTITIONER

## 2024-08-28 PROCEDURE — 3044F HG A1C LEVEL LT 7.0%: CPT | Mod: CPTII,,, | Performed by: NURSE PRACTITIONER

## 2024-08-28 PROCEDURE — 3066F NEPHROPATHY DOC TX: CPT | Mod: CPTII,,, | Performed by: NURSE PRACTITIONER

## 2024-08-28 PROCEDURE — 1160F RVW MEDS BY RX/DR IN RCRD: CPT | Mod: CPTII,,, | Performed by: NURSE PRACTITIONER

## 2024-08-28 PROCEDURE — 3061F NEG MICROALBUMINURIA REV: CPT | Mod: CPTII,,, | Performed by: NURSE PRACTITIONER

## 2024-08-28 PROCEDURE — 1159F MED LIST DOCD IN RCRD: CPT | Mod: CPTII,,, | Performed by: NURSE PRACTITIONER

## 2024-08-28 PROCEDURE — 99205 OFFICE O/P NEW HI 60 MIN: CPT | Mod: ,,, | Performed by: NURSE PRACTITIONER

## 2024-08-28 PROCEDURE — 3288F FALL RISK ASSESSMENT DOCD: CPT | Mod: CPTII,,, | Performed by: NURSE PRACTITIONER

## 2024-08-28 PROCEDURE — 72052 X-RAY EXAM NECK SPINE 6/>VWS: CPT | Mod: TC

## 2024-08-28 PROCEDURE — 3075F SYST BP GE 130 - 139MM HG: CPT | Mod: CPTII,,, | Performed by: NURSE PRACTITIONER

## 2024-08-28 PROCEDURE — 1101F PT FALLS ASSESS-DOCD LE1/YR: CPT | Mod: CPTII,,, | Performed by: NURSE PRACTITIONER

## 2024-10-09 ENCOUNTER — CLINICAL SUPPORT (OUTPATIENT)
Dept: PRIMARY CARE CLINIC | Facility: CLINIC | Age: 69
End: 2024-10-09
Payer: MEDICARE

## 2024-10-09 DIAGNOSIS — D64.9 ANEMIA, UNSPECIFIED TYPE: ICD-10-CM

## 2024-10-09 LAB
BASOPHILS # BLD AUTO: 0.08 X10(3)/MCL
BASOPHILS NFR BLD AUTO: 1.3 %
EOSINOPHIL # BLD AUTO: 0.61 X10(3)/MCL (ref 0–0.9)
EOSINOPHIL NFR BLD AUTO: 9.7 %
ERYTHROCYTE [DISTWIDTH] IN BLOOD BY AUTOMATED COUNT: 15 % (ref 11.5–17)
FERRITIN SERPL-MCNC: 10.24 NG/ML (ref 21.81–274.66)
FOLATE SERPL-MCNC: 15.6 NG/ML (ref 7–31.4)
HCT VFR BLD AUTO: 33.9 % (ref 42–52)
HEMATOLOGIST REVIEW: NORMAL
HGB BLD-MCNC: 10.6 G/DL (ref 14–18)
IMM GRANULOCYTES # BLD AUTO: 0.02 X10(3)/MCL (ref 0–0.04)
IMM GRANULOCYTES NFR BLD AUTO: 0.3 %
IRON SATN MFR SERPL: 8 % (ref 20–50)
IRON SERPL-MCNC: 31 UG/DL (ref 65–175)
LYMPHOCYTES # BLD AUTO: 1.42 X10(3)/MCL (ref 0.6–4.6)
LYMPHOCYTES NFR BLD AUTO: 22.5 %
MCH RBC QN AUTO: 27 PG (ref 27–31)
MCHC RBC AUTO-ENTMCNC: 31.3 G/DL (ref 33–36)
MCV RBC AUTO: 86.3 FL (ref 80–94)
MONOCYTES # BLD AUTO: 0.48 X10(3)/MCL (ref 0.1–1.3)
MONOCYTES NFR BLD AUTO: 7.6 %
NEUTROPHILS # BLD AUTO: 3.7 X10(3)/MCL (ref 2.1–9.2)
NEUTROPHILS NFR BLD AUTO: 58.6 %
NRBC BLD AUTO-RTO: 0 %
PLATELET # BLD AUTO: 314 X10(3)/MCL (ref 130–400)
PMV BLD AUTO: 11.3 FL (ref 7.4–10.4)
RBC # BLD AUTO: 3.93 X10(6)/MCL (ref 4.7–6.1)
RET# (OHS): 0.05 X10E6/UL (ref 0.03–0.1)
RETICULOCYTE COUNT AUTOMATED (OLG): 1.23 % (ref 1.1–2.1)
TIBC SERPL-MCNC: 357 UG/DL (ref 69–240)
TIBC SERPL-MCNC: 388 UG/DL (ref 250–450)
TRANSFERRIN SERPL-MCNC: 363 MG/DL (ref 163–344)
VIT B12 SERPL-MCNC: 868 PG/ML (ref 213–816)
WBC # BLD AUTO: 6.31 X10(3)/MCL (ref 4.5–11.5)

## 2024-10-09 PROCEDURE — 82746 ASSAY OF FOLIC ACID SERUM: CPT | Performed by: GENERAL PRACTICE

## 2024-10-09 PROCEDURE — 82728 ASSAY OF FERRITIN: CPT | Performed by: GENERAL PRACTICE

## 2024-10-09 PROCEDURE — 82607 VITAMIN B-12: CPT | Performed by: GENERAL PRACTICE

## 2024-10-09 PROCEDURE — 85025 COMPLETE CBC W/AUTO DIFF WBC: CPT | Performed by: GENERAL PRACTICE

## 2024-10-09 PROCEDURE — 83550 IRON BINDING TEST: CPT | Performed by: GENERAL PRACTICE

## 2024-10-09 PROCEDURE — 85045 AUTOMATED RETICULOCYTE COUNT: CPT | Performed by: GENERAL PRACTICE

## 2024-10-09 PROCEDURE — 36415 COLL VENOUS BLD VENIPUNCTURE: CPT

## 2024-10-09 PROCEDURE — 83540 ASSAY OF IRON: CPT | Performed by: GENERAL PRACTICE

## 2024-10-09 NOTE — PROGRESS NOTES
Patient verified name and .Patient here for nurse visit to draw Anemia labs with (22) gauge needle. Patient was drawn in left antecubital .No complications or issues occurred. Patient tolerated venipuncture well. Patient expressed no questions or concerns.

## 2024-10-15 NOTE — PROGRESS NOTES
"Subjective:       Patient ID: Pito Garcia is a 69 y.o. male.    Chief Complaint: Hypertension and Follow-up    Patient presents to the clinic today for chronic condition follow-up.  Doing well, no specific complaints, tolerating all medications, reporting no significant side effects or problems.    Last wellness exam was 04/09/2024.    Chronic conditions being treated include but are not limited to primary HTN, diabetes mellitus, dyslipidemia, JOE, anemia.      Review of Systems   Constitutional: Negative.  Negative for fatigue and fever.   HENT: Negative.  Negative for sore throat and trouble swallowing.    Eyes: Negative.  Negative for redness and visual disturbance.   Respiratory: Negative.  Negative for chest tightness and shortness of breath.    Cardiovascular: Negative.  Negative for chest pain.   Gastrointestinal: Negative.  Negative for abdominal pain, blood in stool and nausea.   Endocrine: Negative.  Negative for cold intolerance, heat intolerance and polyuria.   Genitourinary: Negative.    Musculoskeletal: Negative.  Negative for arthralgias, gait problem and joint swelling.   Integumentary:  Negative for rash. Negative.   Neurological: Negative.  Negative for dizziness, weakness and headaches.   Psychiatric/Behavioral: Negative.  Negative for agitation and dysphoric mood.            Patient Care Team:  Scooter Michelle MD as PCP - General (Family Medicine)  Josemanuel Felipe MD as Consulting Physician (Neurology)  Jason High MD as Consulting Physician (Gastroenterology)  Wilfred Dick Jr., MD as Consulting Physician (Physical Medicine and Rehabilitation)  Chastity Kathleen MD as Consulting Physician (Otolaryngology)  Objective:   Visit Vitals  /73 (BP Location: Left arm, Patient Position: Sitting)   Pulse 67   Resp 18   Ht 5' 6" (1.676 m)   Wt 82.1 kg (181 lb)   SpO2 98%   BMI 29.21 kg/m²        Physical Exam  Constitutional:       Appearance: Normal appearance.   HENT:      Head: " Normocephalic.      Mouth/Throat:      Mouth: Mucous membranes are moist.      Pharynx: Oropharynx is clear.   Eyes:      Conjunctiva/sclera: Conjunctivae normal.      Pupils: Pupils are equal, round, and reactive to light.   Cardiovascular:      Rate and Rhythm: Normal rate and regular rhythm.      Heart sounds: Normal heart sounds.   Pulmonary:      Effort: Pulmonary effort is normal.      Breath sounds: Normal breath sounds.   Abdominal:      General: Abdomen is flat.      Palpations: Abdomen is soft.   Musculoskeletal:         General: Normal range of motion.      Cervical back: Neck supple.   Skin:     General: Skin is warm and dry.   Neurological:      General: No focal deficit present.      Mental Status: He is alert and oriented to person, place, and time.   Psychiatric:         Mood and Affect: Mood normal.         Behavior: Behavior normal.         Thought Content: Thought content normal.         Judgment: Judgment normal.           Lab Results   Component Value Date     04/05/2024    K 3.9 04/05/2024     04/05/2024    CO2 28 04/05/2024    BUN 14.9 04/05/2024    CREATININE 0.79 04/05/2024    CALCIUM 10.4 (H) 04/05/2024    ALBUMIN 4.0 04/05/2024    BILITOT 0.4 04/05/2024    ALKPHOS 82 04/05/2024    AST 26 04/05/2024    ALT 19 04/05/2024    EGFRNORACEVR >60 04/05/2024     Lab Results   Component Value Date    WBC 6.31 10/09/2024    RBC 3.93 (L) 10/09/2024    HGB 10.6 (L) 10/09/2024    HCT 33.9 (L) 10/09/2024    MCV 86.3 10/09/2024    RDW 15.0 10/09/2024     10/09/2024      Lab Results   Component Value Date    CHOL 131 04/05/2024    TRIG 65 04/05/2024    HDL 52 04/05/2024    LDL 66.00 04/05/2024    TOTALCHOLEST 3 04/05/2024     Lab Results   Component Value Date    TSH 1.137 04/05/2024     Lab Results   Component Value Date    HGBA1C 5.9 04/05/2024        Lab Results   Component Value Date    PSA 1.86 04/05/2024         Assessment:       ICD-10-CM ICD-9-CM   1. Primary hypertension  I10  401.9   2. Type 2 diabetes mellitus without complication, without long-term current use of insulin  E11.9 250.00   3. Dyslipidemia  E78.5 272.4   4. JOE (generalized anxiety disorder)  F41.1 300.02   5. Iron deficiency anemia secondary to inadequate dietary iron intake  D50.8 280.1   6. Screening for prostate cancer  Z12.5 V76.44       Current Outpatient Medications   Medication Instructions    amLODIPine (NORVASC) 5 mg, Oral, Daily    atorvastatin (LIPITOR) 40 mg, Oral, Daily    calcium carbonate (CALCIUM 600 ORAL) 1 tablet, Daily    clonazePAM (KLONOPIN) 1 mg, Oral, Nightly    co-enzyme Q-10 30 mg, Daily    docosahexaenoic acid/epa (FISH OIL ORAL)     hydroCHLOROthiazide (HYDRODIURIL) 25 mg, Oral, Daily    lisinopriL 10 mg    magnesium oxide 400 mg magnesium Cap 1 tablet, Daily    MEN'S MULTI-VITAMIN ORAL 1 tablet, Daily    metFORMIN (GLUCOPHAGE) 1,000 mg, Oral, 2 times daily    omega-3 fatty acids 1,000 mg Cap 1 capsule, Daily    omeprazole (PRILOSEC) 20 mg    sertraline (ZOLOFT) 100 mg, Oral, Daily     Plan:     Problem List Items Addressed This Visit          Psychiatric    JOE (generalized anxiety disorder) (Chronic)    Overview     Prescribed sertraline 100 mg daily.    Patient reports stability of moods, and effectiveness of medications, including no agitation, significant somnolence, or significant bouts of anxiety or depression.  Patient is not having any sleep disturbance.  Current treatment plan will continue, with plans to discuss any significant changes in patient's status if necessary if anything changes in the future..    Medication will be continued at current dosage.            Cardiac/Vascular    Primary hypertension - Primary (Chronic)    Overview     Prescribed lisinopril 10 mg daily amlodipine 5 mg daily, hydrochlorothiazide 25 mg daily.    Blood pressures appear to be adequately controlled with current treatment plan, including prescription blood pressure medication.  Medication(s) appear to  be effective at current dosages, and are not causing any side effects or problems.  Continue medical management and continue home blood pressure checks.  Average blood pressures at home should be no greater than 130/80.  Patient instructed to contact the clinic if blood pressures become elevated at any point prior to next clinic visit.    Medication will be continued at the current dosage.         Relevant Medications    amLODIPine (NORVASC) 5 MG tablet    Other Relevant Orders    Comprehensive Metabolic Panel    CBC Auto Differential    TSH    Dyslipidemia (Chronic)    Overview     Prescribed Lipitor 40 mg daily.    Most recent cholesterol/lipid blood testing shows adequate control, continue current treatment plan, including prescription medications if prescribed, and/or diet high in fiber, low in saturated fats as discussed during clinic visit.    Medication will be continued at current dosage.         Relevant Orders    Lipid Panel       Oncology    Iron deficiency anemia secondary to inadequate dietary iron intake (Chronic)    Overview     Patient has a diagnosis of iron-deficiency anemia.  If anemia continues, patient is encouraged to increase the amount of dietary iron, and/or take supplemental iron.  We will continue to monitor blood counts annually, or more often if necessary.  Depending on persistence and/or severity of the anemia, referral may be considered.    Notably, he is up-to-date on his colonoscopy/colon cancer screening.         Current Assessment & Plan          Component Ref Range & Units 7 d ago   Iron Binding Capacity Unsaturated 69 - 240 ug/dL 357 High    Iron Level 65 - 175 ug/dL 31 Low    Transferrin 163 - 344 mg/dL 363 High    Iron Binding Capacity Total 250 - 450 ug/dL 388   Iron Saturation 20 - 50 % 8 Low              Component Ref Range & Units 7 d ago   Ferritin Level 21.81 - 274.66 ng/mL 10.24 Low                Relevant Orders    CBC Auto Differential    Iron and TIBC    Ferritin        Endocrine    Type 2 diabetes mellitus without complication, without long-term current use of insulin (Chronic)    Overview     Prescribed metformin 1000 mg b.i.d..    Most recent hemoglobin A1c shows good control on diabetic treatment plan, including diabetic prescription medication.  Continue current treatment plan, medical regimen, and lifestyle modification as discussed at this visit and during previous visits.    Medication will be continued at current dosage.         Relevant Orders    Hemoglobin A1C    Microalbumin/Creatinine Ratio, Urine     Other Visit Diagnoses       Screening for prostate cancer        This diagnosis does not apply to this visit, appropriate prostate cancer screening will be ordered for next visit/wellness exam.    Relevant Orders    PSA, Screening                  Follow-up in three months, iron-deficiency anemia recheck.    Follow up in about 6 months (around 4/21/2025) for Medicare Wellness.    This note was created with the assistance of [employer] voice recognition software or phone dictation. There may be transcription errors as a result of using this technology. Effort has been made to assure accuracy of transcription but any obvious errors or omissions should be clarified with the author of the document.

## 2024-10-16 ENCOUNTER — OFFICE VISIT (OUTPATIENT)
Dept: PRIMARY CARE CLINIC | Facility: CLINIC | Age: 69
End: 2024-10-16
Payer: MEDICARE

## 2024-10-16 VITALS
OXYGEN SATURATION: 98 % | HEART RATE: 67 BPM | HEIGHT: 66 IN | SYSTOLIC BLOOD PRESSURE: 118 MMHG | WEIGHT: 181 LBS | RESPIRATION RATE: 18 BRPM | DIASTOLIC BLOOD PRESSURE: 73 MMHG | BODY MASS INDEX: 29.09 KG/M2

## 2024-10-16 DIAGNOSIS — I10 PRIMARY HYPERTENSION: Primary | Chronic | ICD-10-CM

## 2024-10-16 DIAGNOSIS — Z12.5 SCREENING FOR PROSTATE CANCER: ICD-10-CM

## 2024-10-16 DIAGNOSIS — F41.1 GAD (GENERALIZED ANXIETY DISORDER): Chronic | ICD-10-CM

## 2024-10-16 DIAGNOSIS — E11.9 TYPE 2 DIABETES MELLITUS WITHOUT COMPLICATION, WITHOUT LONG-TERM CURRENT USE OF INSULIN: Chronic | ICD-10-CM

## 2024-10-16 DIAGNOSIS — D50.8 IRON DEFICIENCY ANEMIA SECONDARY TO INADEQUATE DIETARY IRON INTAKE: Chronic | ICD-10-CM

## 2024-10-16 DIAGNOSIS — E78.5 DYSLIPIDEMIA: Chronic | ICD-10-CM

## 2024-10-16 PROCEDURE — 1159F MED LIST DOCD IN RCRD: CPT | Mod: CPTII,,, | Performed by: GENERAL PRACTICE

## 2024-10-16 PROCEDURE — 99214 OFFICE O/P EST MOD 30 MIN: CPT | Mod: ,,, | Performed by: GENERAL PRACTICE

## 2024-10-16 PROCEDURE — 3078F DIAST BP <80 MM HG: CPT | Mod: CPTII,,, | Performed by: GENERAL PRACTICE

## 2024-10-16 PROCEDURE — 3061F NEG MICROALBUMINURIA REV: CPT | Mod: CPTII,,, | Performed by: GENERAL PRACTICE

## 2024-10-16 PROCEDURE — 1160F RVW MEDS BY RX/DR IN RCRD: CPT | Mod: CPTII,,, | Performed by: GENERAL PRACTICE

## 2024-10-16 PROCEDURE — 3044F HG A1C LEVEL LT 7.0%: CPT | Mod: CPTII,,, | Performed by: GENERAL PRACTICE

## 2024-10-16 PROCEDURE — 3066F NEPHROPATHY DOC TX: CPT | Mod: CPTII,,, | Performed by: GENERAL PRACTICE

## 2024-10-16 PROCEDURE — 3008F BODY MASS INDEX DOCD: CPT | Mod: CPTII,,, | Performed by: GENERAL PRACTICE

## 2024-10-16 PROCEDURE — 3074F SYST BP LT 130 MM HG: CPT | Mod: CPTII,,, | Performed by: GENERAL PRACTICE

## 2024-10-16 RX ORDER — AMLODIPINE BESYLATE 5 MG/1
5 TABLET ORAL DAILY
Qty: 90 TABLET | Refills: 3 | Status: SHIPPED | OUTPATIENT
Start: 2024-10-16 | End: 2025-10-16

## 2024-10-16 NOTE — ASSESSMENT & PLAN NOTE
Component Ref Range & Units 7 d ago   Iron Binding Capacity Unsaturated 69 - 240 ug/dL 357 High    Iron Level 65 - 175 ug/dL 31 Low    Transferrin 163 - 344 mg/dL 363 High    Iron Binding Capacity Total 250 - 450 ug/dL 388   Iron Saturation 20 - 50 % 8 Low              Component Ref Range & Units 7 d ago   Ferritin Level 21.81 - 274.66 ng/mL 10.24 Low

## 2024-10-30 LAB
LEFT EYE DM RETINOPATHY: NEGATIVE
RIGHT EYE DM RETINOPATHY: NEGATIVE

## 2024-12-02 DIAGNOSIS — F41.1 GAD (GENERALIZED ANXIETY DISORDER): Primary | Chronic | ICD-10-CM

## 2024-12-02 RX ORDER — SERTRALINE HYDROCHLORIDE 100 MG/1
100 TABLET, FILM COATED ORAL DAILY
Qty: 90 TABLET | Refills: 3 | Status: SHIPPED | OUTPATIENT
Start: 2024-12-02

## 2024-12-13 NOTE — PROGRESS NOTES
Subjective     Patient ID: Pito Garcia is a 69 y.o. male.    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, occasional screaming)  Not bothersome to his wife (she's a chronic pain pt and does not sleep well anyway)    Doesn't want to adjust meds    Review of Systems  A 14pt ROS was reviewed & is negative unless otherwise documented in the HPI       Objective     Physical Exam  GENERAL: NAD, calm, cooperative, appropriate  Awake/alert  Well groomed  RESP: CTAB  HEART: RRR  No LE edema  MENTAL STATUS: oriented, follow commands reliably  SPEECH/LANGUAGE: clear, fluent  CN:  Perrla, eomi, vff, gaze conjugate  No tactile or motor facial asymmetry  Tongue protrudes midline  Motor: no focal weakness  Cerebellar: no tremor or dysmetria  Sensory: normal to tactile stim/vibration  DTRs: normal +2, symmetric  Gait: steady       Assessment and Plan     1. REM sleep behavior disorder  -     clonazePAM (KLONOPIN) 1 MG tablet; Take 1 tablet (1 mg total) by mouth every evening.  Dispense: 90 tablet; Refill: 1      Cont clonazepam 1mg/day  F/u 6mo    ML Dominguez-BC     Follow up in about 6 months (around 6/16/2025) for REMBD.

## 2024-12-16 ENCOUNTER — OFFICE VISIT (OUTPATIENT)
Dept: NEUROLOGY | Facility: CLINIC | Age: 69
End: 2024-12-16
Payer: MEDICARE

## 2024-12-16 VITALS
DIASTOLIC BLOOD PRESSURE: 72 MMHG | HEIGHT: 66 IN | SYSTOLIC BLOOD PRESSURE: 120 MMHG | BODY MASS INDEX: 28.77 KG/M2 | WEIGHT: 179 LBS

## 2024-12-16 DIAGNOSIS — G47.52 REM SLEEP BEHAVIOR DISORDER: Primary | ICD-10-CM

## 2024-12-16 PROCEDURE — 3008F BODY MASS INDEX DOCD: CPT | Mod: CPTII,S$GLB,, | Performed by: NURSE PRACTITIONER

## 2024-12-16 PROCEDURE — 99214 OFFICE O/P EST MOD 30 MIN: CPT | Mod: S$GLB,,, | Performed by: NURSE PRACTITIONER

## 2024-12-16 PROCEDURE — 1101F PT FALLS ASSESS-DOCD LE1/YR: CPT | Mod: CPTII,S$GLB,, | Performed by: NURSE PRACTITIONER

## 2024-12-16 PROCEDURE — 1159F MED LIST DOCD IN RCRD: CPT | Mod: CPTII,S$GLB,, | Performed by: NURSE PRACTITIONER

## 2024-12-16 PROCEDURE — 3078F DIAST BP <80 MM HG: CPT | Mod: CPTII,S$GLB,, | Performed by: NURSE PRACTITIONER

## 2024-12-16 PROCEDURE — 3074F SYST BP LT 130 MM HG: CPT | Mod: CPTII,S$GLB,, | Performed by: NURSE PRACTITIONER

## 2024-12-16 PROCEDURE — 3288F FALL RISK ASSESSMENT DOCD: CPT | Mod: CPTII,S$GLB,, | Performed by: NURSE PRACTITIONER

## 2024-12-16 PROCEDURE — 1160F RVW MEDS BY RX/DR IN RCRD: CPT | Mod: CPTII,S$GLB,, | Performed by: NURSE PRACTITIONER

## 2024-12-16 PROCEDURE — 3066F NEPHROPATHY DOC TX: CPT | Mod: CPTII,S$GLB,, | Performed by: NURSE PRACTITIONER

## 2024-12-16 PROCEDURE — 1125F AMNT PAIN NOTED PAIN PRSNT: CPT | Mod: CPTII,S$GLB,, | Performed by: NURSE PRACTITIONER

## 2024-12-16 PROCEDURE — 99999 PR PBB SHADOW E&M-EST. PATIENT-LVL III: CPT | Mod: PBBFAC,,, | Performed by: NURSE PRACTITIONER

## 2024-12-16 PROCEDURE — 3061F NEG MICROALBUMINURIA REV: CPT | Mod: CPTII,S$GLB,, | Performed by: NURSE PRACTITIONER

## 2024-12-16 PROCEDURE — 3044F HG A1C LEVEL LT 7.0%: CPT | Mod: CPTII,S$GLB,, | Performed by: NURSE PRACTITIONER

## 2024-12-16 RX ORDER — LANOLIN ALCOHOL/MO/W.PET/CERES
1 CREAM (GRAM) TOPICAL
COMMUNITY

## 2024-12-16 RX ORDER — CLONAZEPAM 1 MG/1
1 TABLET ORAL NIGHTLY
Qty: 90 TABLET | Refills: 1 | Status: SHIPPED | OUTPATIENT
Start: 2024-12-16

## 2024-12-16 RX ORDER — BUTALB/ACETAMINOPHEN/CAFFEINE 50-325-40
1 TABLET ORAL 2 TIMES DAILY
COMMUNITY

## 2025-01-15 ENCOUNTER — CLINICAL SUPPORT (OUTPATIENT)
Dept: PRIMARY CARE CLINIC | Facility: CLINIC | Age: 70
End: 2025-01-15
Payer: MEDICARE

## 2025-01-15 DIAGNOSIS — D50.8 IRON DEFICIENCY ANEMIA SECONDARY TO INADEQUATE DIETARY IRON INTAKE: ICD-10-CM

## 2025-01-15 LAB
BASOPHILS # BLD AUTO: 0.06 X10(3)/MCL
BASOPHILS NFR BLD AUTO: 0.7 %
EOSINOPHIL # BLD AUTO: 0.84 X10(3)/MCL (ref 0–0.9)
EOSINOPHIL NFR BLD AUTO: 10 %
ERYTHROCYTE [DISTWIDTH] IN BLOOD BY AUTOMATED COUNT: 15.4 % (ref 11.5–17)
FERRITIN SERPL-MCNC: 23.12 NG/ML (ref 21.81–274.66)
HCT VFR BLD AUTO: 42.9 % (ref 42–52)
HGB BLD-MCNC: 14 G/DL (ref 14–18)
IMM GRANULOCYTES # BLD AUTO: 0.03 X10(3)/MCL (ref 0–0.04)
IMM GRANULOCYTES NFR BLD AUTO: 0.4 %
IRON SATN MFR SERPL: 18 % (ref 20–50)
IRON SERPL-MCNC: 62 UG/DL (ref 65–175)
LYMPHOCYTES # BLD AUTO: 1.7 X10(3)/MCL (ref 0.6–4.6)
LYMPHOCYTES NFR BLD AUTO: 20.3 %
MCH RBC QN AUTO: 30.7 PG (ref 27–31)
MCHC RBC AUTO-ENTMCNC: 32.6 G/DL (ref 33–36)
MCV RBC AUTO: 94.1 FL (ref 80–94)
MONOCYTES # BLD AUTO: 0.63 X10(3)/MCL (ref 0.1–1.3)
MONOCYTES NFR BLD AUTO: 7.5 %
NEUTROPHILS # BLD AUTO: 5.1 X10(3)/MCL (ref 2.1–9.2)
NEUTROPHILS NFR BLD AUTO: 61.1 %
NRBC BLD AUTO-RTO: 0 %
PLATELET # BLD AUTO: 301 X10(3)/MCL (ref 130–400)
PMV BLD AUTO: 10.9 FL (ref 7.4–10.4)
RBC # BLD AUTO: 4.56 X10(6)/MCL (ref 4.7–6.1)
TIBC SERPL-MCNC: 285 UG/DL (ref 60–240)
TIBC SERPL-MCNC: 347 UG/DL (ref 250–450)
TRANSFERRIN SERPL-MCNC: 316 MG/DL (ref 163–344)
WBC # BLD AUTO: 8.36 X10(3)/MCL (ref 4.5–11.5)

## 2025-01-15 PROCEDURE — 82728 ASSAY OF FERRITIN: CPT | Performed by: GENERAL PRACTICE

## 2025-01-15 PROCEDURE — 85025 COMPLETE CBC W/AUTO DIFF WBC: CPT | Performed by: GENERAL PRACTICE

## 2025-01-15 PROCEDURE — 83550 IRON BINDING TEST: CPT | Performed by: GENERAL PRACTICE

## 2025-01-15 PROCEDURE — 36415 COLL VENOUS BLD VENIPUNCTURE: CPT

## 2025-01-21 ENCOUNTER — OFFICE VISIT (OUTPATIENT)
Dept: PRIMARY CARE CLINIC | Facility: CLINIC | Age: 70
End: 2025-01-21
Payer: MEDICARE

## 2025-01-21 DIAGNOSIS — E11.9 TYPE 2 DIABETES MELLITUS WITHOUT COMPLICATION, WITHOUT LONG-TERM CURRENT USE OF INSULIN: Chronic | ICD-10-CM

## 2025-01-21 DIAGNOSIS — D50.8 IRON DEFICIENCY ANEMIA SECONDARY TO INADEQUATE DIETARY IRON INTAKE: Primary | Chronic | ICD-10-CM

## 2025-01-21 NOTE — ASSESSMENT & PLAN NOTE
Component Ref Range & Units 6 d ago  (1/15/25) 3 mo ago  (10/9/24) 9 mo ago  (4/5/24) 1 yr ago  (4/3/23) 2 yr ago  (4/1/22) 2 yr ago  (4/1/22) 3 yr ago  (1/28/21) 3 yr ago  (1/28/21)   WBC 4.50 - 11.50 x10(3)/mcL 8.36 6.31 6.64 7.0 R 7.8 R  7.1 R    RBC 4.70 - 6.10 x10(6)/mcL 4.56 Low  3.93 Low  4.14 Low  4.33 Low  4.62 R  4.57 Low     Hgb 14.0 - 18.0 g/dL 14.0 10.6 Low  11.2 Low  12.5 Low  13.7 R  13.6 Low  R    Hct 42.0 - 52.0 % 42.9 33.9 Low  36.6 Low  39.0 Low  42.5 R  43.5    MCV 80.0 - 94.0 fL 94.1 High  86.3 88.4 90.1 92.0 R  95.2 High     MCH 27.0 - 31.0 pg 30.7 27.0 27.1 28.9 29.7 R  29.8    MCHC 33.0 - 36.0 g/dL 32.6 Low  31.3 Low  30.6 Low  32.1 Low  32.2 R  31.3 Low  R    RDW 11.5 - 17.0 % 15.4 15.0 15.0 13.8 13.8 R  13.7    Platelet 130 - 400 x10(3)/mcL 301 314 415 High  329 341 R  342               Component Ref Range & Units 6 d ago 3 mo ago   Iron Binding Capacity Unsaturated 60 - 240 ug/dL 285 High  357 High  R   Iron Level 65 - 175 ug/dL 62 Low  31 Low    Transferrin 163 - 344 mg/dL 316 363 High    Iron Binding Capacity Total 250 - 450 ug/dL 347 388   Iron Saturation 20 - 50 % 18 Low  8 Low               Component Ref Range & Units 6 d ago 3 mo ago   Ferritin Level 21.81 - 274.66 ng/mL 23.12 10.24 Low

## 2025-01-21 NOTE — ASSESSMENT & PLAN NOTE
Component Ref Range & Units 9 mo ago 1 yr ago 2 yr ago 3 yr ago 4 yr ago 5 yr ago 6 yr ago   Hemoglobin A1c <=7.0 % 5.9 6.1 5.5 R 5.6 R 6.3 R 5.9 R 6.6 High  R   Estimated Average Glucose mg/dL 122.6 128.4 111.2 R 114.0 134 123 143

## 2025-01-21 NOTE — PROGRESS NOTES
Subjective:       Patient ID: Pito Garcia is a 69 y.o. male.    Chief Complaint: No chief complaint on file.    Established patient, presents to the clinic through virtual means for follow-up and to check on his iron-deficiency anemia.  It was determined that was iron-deficiency, causing significant anemia about three months ago with a hemoglobin around 10.  He has been on iron supplementation since, initially twice a day, now only went today, trying to take it around other medications which may be causing an interaction with the absorption of the iron supplement.  No symptoms or problems, no side effects with the iron supplementation.    I have reviewed the patient's name and date of birth.  I verbally reviewed the patient's past medical history, active medication list and allergy list.  I will also verify the patient's identity with a picture ID if necessary.  I have verified that the patient is in the [facility].  The patient has consented to be treated remotely by virtual appointment via [facility] approved interactive audiovisual format.  The patient has access to a working audiovisual format device.    Originating site (whether patient is located): home  Distant site (whether provider is located):  [facility]      Review of Systems   Constitutional:  Negative for activity change and unexpected weight change.   HENT:  Negative for hearing loss, rhinorrhea and trouble swallowing.    Eyes:  Negative for discharge and visual disturbance.   Respiratory:  Negative for chest tightness and wheezing.    Cardiovascular:  Negative for chest pain and palpitations.   Gastrointestinal:  Negative for blood in stool, constipation, diarrhea and vomiting.   Endocrine: Negative for polydipsia and polyuria.   Genitourinary:  Negative for difficulty urinating, hematuria and urgency.   Musculoskeletal:  Positive for neck pain. Negative for arthralgias and joint swelling.   Neurological:  Positive  for headaches. Negative for weakness.   Psychiatric/Behavioral:  Negative for confusion and dysphoric mood.            Patient Care Team:  Scooter Michelle MD as PCP - General (Family Medicine)  Felipe, Josemanuel DAS MD as Consulting Physician (Neurology)  Jason High MD as Consulting Physician (Gastroenterology)  Wilfred Dick Jr., MD as Consulting Physician (Physical Medicine and Rehabilitation)  Chastity Kathleen MD as Consulting Physician (Otolaryngology)  Objective:   There were no vitals taken for this visit.     Physical Exam  Constitutional:       Appearance: Normal appearance.   Psychiatric:         Mood and Affect: Mood normal.         Behavior: Behavior normal.         Thought Content: Thought content normal.           Lab Results   Component Value Date     04/05/2024    K 3.9 04/05/2024     04/05/2024    CO2 28 04/05/2024    BUN 14.9 04/05/2024    CREATININE 0.79 04/05/2024    CALCIUM 10.4 (H) 04/05/2024    ALBUMIN 4.0 04/05/2024    BILITOT 0.4 04/05/2024    ALKPHOS 82 04/05/2024    AST 26 04/05/2024    ALT 19 04/05/2024    EGFRNORACEVR >60 04/05/2024     Lab Results   Component Value Date    WBC 8.36 01/15/2025    RBC 4.56 (L) 01/15/2025    HGB 14.0 01/15/2025    HCT 42.9 01/15/2025    MCV 94.1 (H) 01/15/2025    RDW 15.4 01/15/2025     01/15/2025      Lab Results   Component Value Date    CHOL 131 04/05/2024    TRIG 65 04/05/2024    HDL 52 04/05/2024    LDL 66.00 04/05/2024    TOTALCHOLEST 3 04/05/2024     Lab Results   Component Value Date    TSH 1.137 04/05/2024     Lab Results   Component Value Date    HGBA1C 5.9 04/05/2024        Lab Results   Component Value Date    PSA 1.86 04/05/2024         Assessment:       ICD-10-CM ICD-9-CM   1. Iron deficiency anemia secondary to inadequate dietary iron intake  D50.8 280.1   2. Type 2 diabetes mellitus without complication, without long-term current use of insulin  E11.9 250.00       Current Outpatient Medications   Medication  Instructions    amLODIPine (NORVASC) 5 mg, Oral, Daily    atorvastatin (LIPITOR) 40 mg, Oral, Daily    calcium carbonate (CALCIUM 600 ORAL) 1 tablet, Daily    calcium citrate-vitamin D3 315-200 mg (CITRACAL+D) 315 mg-5 mcg (200 unit) per tablet 1 tablet, 2 times daily    clonazePAM (KLONOPIN) 1 mg, Oral, Nightly    co-enzyme Q-10 30 mg, Daily    docosahexaenoic acid/epa (FISH OIL ORAL)     ferrous sulfate (FEOSOL) Tab tablet 1 tablet, With breakfast    hydroCHLOROthiazide (HYDRODIURIL) 25 mg, Oral, Daily    lisinopriL 10 mg    magnesium oxide 400 mg magnesium Cap 1 tablet, Daily    MEN'S MULTI-VITAMIN ORAL 1 tablet, Daily    metFORMIN (GLUCOPHAGE) 1,000 mg, Oral, 2 times daily    omega-3 fatty acids 1,000 mg Cap 1 capsule, Daily    omeprazole (PRILOSEC) 20 mg    sertraline (ZOLOFT) 100 mg, Oral, Daily     Plan:     Problem List Items Addressed This Visit          Oncology    Iron deficiency anemia secondary to inadequate dietary iron intake - Primary (Chronic)    Overview     Patient has a diagnosis of iron-deficiency anemia.  Iron supplementation appears to be effective, currently once a day, he will be starting it twice a day, watching for a potential interactions between other medications, and we will continue to monitor his blood counts regularly, probably annually if they normalize.    Notably, he is up-to-date on his colonoscopy/colon cancer screening.         Current Assessment & Plan                 Component Ref Range & Units 6 d ago  (1/15/25) 3 mo ago  (10/9/24) 9 mo ago  (4/5/24) 1 yr ago  (4/3/23) 2 yr ago  (4/1/22) 2 yr ago  (4/1/22) 3 yr ago  (1/28/21) 3 yr ago  (1/28/21)   WBC 4.50 - 11.50 x10(3)/mcL 8.36 6.31 6.64 7.0 R 7.8 R  7.1 R    RBC 4.70 - 6.10 x10(6)/mcL 4.56 Low  3.93 Low  4.14 Low  4.33 Low  4.62 R  4.57 Low     Hgb 14.0 - 18.0 g/dL 14.0 10.6 Low  11.2 Low  12.5 Low  13.7 R  13.6 Low  R    Hct 42.0 - 52.0 % 42.9 33.9 Low  36.6 Low  39.0 Low  42.5 R  43.5    MCV 80.0 - 94.0 fL 94.1 High   86.3 88.4 90.1 92.0 R  95.2 High     MCH 27.0 - 31.0 pg 30.7 27.0 27.1 28.9 29.7 R  29.8    MCHC 33.0 - 36.0 g/dL 32.6 Low  31.3 Low  30.6 Low  32.1 Low  32.2 R  31.3 Low  R    RDW 11.5 - 17.0 % 15.4 15.0 15.0 13.8 13.8 R  13.7    Platelet 130 - 400 x10(3)/mcL 301 314 415 High  329 341 R  342               Component Ref Range & Units 6 d ago 3 mo ago   Iron Binding Capacity Unsaturated 60 - 240 ug/dL 285 High  357 High  R   Iron Level 65 - 175 ug/dL 62 Low  31 Low    Transferrin 163 - 344 mg/dL 316 363 High    Iron Binding Capacity Total 250 - 450 ug/dL 347 388   Iron Saturation 20 - 50 % 18 Low  8 Low               Component Ref Range & Units 6 d ago 3 mo ago   Ferritin Level 21.81 - 274.66 ng/mL 23.12 10.24 Low                Relevant Orders    Iron and TIBC    Ferritin       Endocrine    Type 2 diabetes mellitus without complication, without long-term current use of insulin (Chronic)    Overview     Prescribed metformin 1000 mg b.i.d..    Most recent hemoglobin A1c shows good control on diabetic treatment plan, including diabetic prescription medication.  Continue current treatment plan, medical regimen, and lifestyle modification as discussed at this visit and during previous visits.    Medication will be continued at current dosage.         Current Assessment & Plan                Component Ref Range & Units 9 mo ago 1 yr ago 2 yr ago 3 yr ago 4 yr ago 5 yr ago 6 yr ago   Hemoglobin A1c <=7.0 % 5.9 6.1 5.5 R 5.6 R 6.3 R 5.9 R 6.6 High  R   Estimated Average Glucose mg/dL 122.6 128.4 111.2 R 114.0 134 123 143                      I spent 23 minutes on this clinical visit, over half of that time face-to-face with the patient discussing the particular diagnosis and the importance of compliance with the treatment plan.    Follow up for next appointment as scheduled or as needed.    This note was created with the assistance of [employer] voice recognition software or phone dictation. There may be transcription errors as  a result of using this technology. Effort has been made to assure accuracy of transcription but any obvious errors or omissions should be clarified with the author of the document.

## 2025-02-12 ENCOUNTER — TELEPHONE (OUTPATIENT)
Dept: NEUROSURGERY | Facility: CLINIC | Age: 70
End: 2025-02-12
Payer: MEDICARE

## 2025-02-12 NOTE — TELEPHONE ENCOUNTER
Called patient to let him know that I have a cancellation and to see if he'd like to be seen sooner. Patient stated that his neck has gotten worse and he has had to stop all exercise. Patient is now scheduled to come in on 2/13/25 at 9am

## 2025-02-13 ENCOUNTER — OFFICE VISIT (OUTPATIENT)
Dept: NEUROSURGERY | Facility: CLINIC | Age: 70
End: 2025-02-13
Payer: MEDICARE

## 2025-02-13 VITALS
HEIGHT: 66 IN | WEIGHT: 187 LBS | BODY MASS INDEX: 30.05 KG/M2 | SYSTOLIC BLOOD PRESSURE: 122 MMHG | HEART RATE: 70 BPM | DIASTOLIC BLOOD PRESSURE: 79 MMHG | RESPIRATION RATE: 16 BRPM

## 2025-02-13 DIAGNOSIS — M47.812 MULTILEVEL CERVICAL SPONDYLOSIS WITHOUT MYELOPATHY: ICD-10-CM

## 2025-02-13 DIAGNOSIS — M48.02 CERVICAL STENOSIS OF SPINAL CANAL: Primary | ICD-10-CM

## 2025-02-13 PROCEDURE — 99215 OFFICE O/P EST HI 40 MIN: CPT | Mod: ,,, | Performed by: NURSE PRACTITIONER

## 2025-02-13 PROCEDURE — 3008F BODY MASS INDEX DOCD: CPT | Mod: CPTII,,, | Performed by: NURSE PRACTITIONER

## 2025-02-13 PROCEDURE — 3078F DIAST BP <80 MM HG: CPT | Mod: CPTII,,, | Performed by: NURSE PRACTITIONER

## 2025-02-13 PROCEDURE — 3074F SYST BP LT 130 MM HG: CPT | Mod: CPTII,,, | Performed by: NURSE PRACTITIONER

## 2025-02-13 PROCEDURE — 1101F PT FALLS ASSESS-DOCD LE1/YR: CPT | Mod: CPTII,,, | Performed by: NURSE PRACTITIONER

## 2025-02-13 PROCEDURE — 3288F FALL RISK ASSESSMENT DOCD: CPT | Mod: CPTII,,, | Performed by: NURSE PRACTITIONER

## 2025-02-13 PROCEDURE — 1125F AMNT PAIN NOTED PAIN PRSNT: CPT | Mod: CPTII,,, | Performed by: NURSE PRACTITIONER

## 2025-02-13 PROCEDURE — 1160F RVW MEDS BY RX/DR IN RCRD: CPT | Mod: CPTII,,, | Performed by: NURSE PRACTITIONER

## 2025-02-13 PROCEDURE — 1159F MED LIST DOCD IN RCRD: CPT | Mod: CPTII,,, | Performed by: NURSE PRACTITIONER

## 2025-02-13 RX ORDER — METHOCARBAMOL 750 MG/1
750 TABLET, FILM COATED ORAL NIGHTLY PRN
Qty: 30 TABLET | Refills: 0 | Status: SHIPPED | OUTPATIENT
Start: 2025-02-13 | End: 2025-02-23
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
## 2025-04-08 ENCOUNTER — PATIENT OUTREACH (OUTPATIENT)
Facility: CLINIC | Age: 70
End: 2025-04-08
Payer: MEDICARE

## 2025-04-08 NOTE — PROGRESS NOTES
Value base Outreach    No call regarding Health maintenance Topics  AW scheduled for 4/17/2025  Future Labs pending collection.  Request [facility]

## 2025-04-08 NOTE — LETTER
4/8/25    AUTHORIZATION FOR RELEASE OF   CONFIDENTIAL INFORMATION    Dr Jones,    We are seeing Pito Gracia, date of birth 1955, in the clinic at [facility]. Scooter Michelle MD is the patient's PCP. Pito Garcia has an outstanding lab/procedure at the time we reviewed his chart. In order to help keep his health information updated, he has authorized us to request the following medical record(s):       2024 EYE EXAM       Please fax records to [facility], Simon, Pernell, MD,  at [phone] or email to [email or web address].         Patient/Legal Guardian Signature  This signature was collected at 03/13/2025    Pito Garcia     Self  _______________________________   Printed Name/Relationship to Patient      Consent for Examination and Treatment: I hereby authorize the providers and employees of [facility] ([facility]) to provide medical treatment/services which includes, but is not limited to, performing and administering tests and diagnostic procedures that are deemed necessary, including, but not limited to, imaging examinations, blood tests and other laboratory procedures as may be required by the hospital, clinic, or may be ordered by my physician(s) or persons working under the general and/or special instructions of my physician(s).      I understand and agree that this consent covers all authorized persons, including but not limited to physicians, residents, nurse practitioners, physicians' assistants, specialists, consultants, student nurses, and independently contracted physicians, who are called upon by the physician in charge, to carry out the diagnostic procedures and medical or surgical treatment.     I hereby authorize [facility] to retain or dispose of any specimens or tissue, should there be such remaining from any test or procedure.     I hereby authorize and give consent for [facility] providers and employees to take photographs, images or videotapes of such  diagnostic, surgical or treatment procedures of Patient as may be required by [facility] or as may be ordered by a physician. I further acknowledge and agree that [facility] may use cameras or other devices for patient monitoring.     I am aware that the practice of medicine is not an exact science, and I acknowledge that no guarantees have been made to me as to the outcome of any tests, procedures or treatment.     Authorization for Release of Information: I understand that my insurance company and/or their agents may need information necessary to make determinations about payment/reimbursement. I hereby provide authorization to release to all insurance companies, their successors, assignees, other parties with whom they may have contracted, or others acting on their behalf, that are involved with payment for any hospital and/or clinic charges incurred by the patient, any information that they request and deem necessary for payment/reimbursement, and/or quality review.  I further authorize the release of my health information to physicians or other health care practitioners on staff who are involved in my health care now and in the future, and to other health care providers, entities, or institutions for the purpose of my continued care and treatment, including referrals.     REGISTRATION AUTHORIZATION  Form No. 41833 (Rev. 3/25/2024)    Page 1 of 3                   Patient Name: Pito Garcia  : 1955  Patient Phone #: [phone]

## 2025-04-11 ENCOUNTER — PATIENT OUTREACH (OUTPATIENT)
Dept: ADMINISTRATIVE | Facility: HOSPITAL | Age: 70
End: 2025-04-11
Payer: MEDICARE

## 2025-04-14 DIAGNOSIS — E11.9 TYPE 2 DIABETES MELLITUS WITHOUT COMPLICATION, WITHOUT LONG-TERM CURRENT USE OF INSULIN: Primary | ICD-10-CM

## 2025-04-14 RX ORDER — METFORMIN HYDROCHLORIDE 1000 MG/1
1000 TABLET ORAL 2 TIMES DAILY
Qty: 180 TABLET | Refills: 3 | Status: SHIPPED | OUTPATIENT
Start: 2025-04-14 | End: 2026-04-14

## 2025-04-15 ENCOUNTER — CLINICAL SUPPORT (OUTPATIENT)
Dept: PRIMARY CARE CLINIC | Facility: CLINIC | Age: 70
End: 2025-04-15
Payer: MEDICARE

## 2025-04-15 DIAGNOSIS — Z12.5 SCREENING FOR PROSTATE CANCER: ICD-10-CM

## 2025-04-15 DIAGNOSIS — D50.8 IRON DEFICIENCY ANEMIA SECONDARY TO INADEQUATE DIETARY IRON INTAKE: Chronic | ICD-10-CM

## 2025-04-15 DIAGNOSIS — E78.5 DYSLIPIDEMIA: Chronic | ICD-10-CM

## 2025-04-15 DIAGNOSIS — E11.9 TYPE 2 DIABETES MELLITUS WITHOUT COMPLICATION, WITHOUT LONG-TERM CURRENT USE OF INSULIN: Chronic | ICD-10-CM

## 2025-04-15 DIAGNOSIS — I10 PRIMARY HYPERTENSION: ICD-10-CM

## 2025-04-15 LAB
ALBUMIN SERPL-MCNC: 4.5 G/DL (ref 3.4–4.8)
ALBUMIN/GLOB SERPL: 1.7 RATIO (ref 1.1–2)
ALP SERPL-CCNC: 80 UNIT/L (ref 40–150)
ALT SERPL-CCNC: 18 UNIT/L (ref 0–55)
ANION GAP SERPL CALC-SCNC: 10 MEQ/L
AST SERPL-CCNC: 24 UNIT/L (ref 11–45)
BASOPHILS # BLD AUTO: 0.06 X10(3)/MCL
BASOPHILS NFR BLD AUTO: 0.8 %
BILIRUB SERPL-MCNC: 0.6 MG/DL
BUN SERPL-MCNC: 17.7 MG/DL (ref 8.4–25.7)
CALCIUM SERPL-MCNC: 9.3 MG/DL (ref 8.8–10)
CHLORIDE SERPL-SCNC: 98 MMOL/L (ref 98–107)
CHOLEST SERPL-MCNC: 137 MG/DL
CHOLEST/HDLC SERPL: 3 {RATIO} (ref 0–5)
CO2 SERPL-SCNC: 31 MMOL/L (ref 23–31)
CREAT SERPL-MCNC: 0.8 MG/DL (ref 0.72–1.25)
CREAT UR-MCNC: 92.1 MG/DL (ref 63–166)
CREAT/UREA NIT SERPL: 22
EOSINOPHIL # BLD AUTO: 0.49 X10(3)/MCL (ref 0–0.9)
EOSINOPHIL NFR BLD AUTO: 6.8 %
ERYTHROCYTE [DISTWIDTH] IN BLOOD BY AUTOMATED COUNT: 13 % (ref 11.5–17)
EST. AVERAGE GLUCOSE BLD GHB EST-MCNC: 114 MG/DL
FERRITIN SERPL-MCNC: 62.41 NG/ML (ref 21.81–274.66)
GFR SERPLBLD CREATININE-BSD FMLA CKD-EPI: >60 ML/MIN/1.73/M2
GLOBULIN SER-MCNC: 2.7 GM/DL (ref 2.4–3.5)
GLUCOSE SERPL-MCNC: 92 MG/DL (ref 82–115)
HBA1C MFR BLD: 5.6 %
HCT VFR BLD AUTO: 42.5 % (ref 42–52)
HDLC SERPL-MCNC: 47 MG/DL (ref 35–60)
HGB BLD-MCNC: 14.1 G/DL (ref 14–18)
IMM GRANULOCYTES # BLD AUTO: 0.01 X10(3)/MCL (ref 0–0.04)
IMM GRANULOCYTES NFR BLD AUTO: 0.1 %
IRON SATN MFR SERPL: 32 % (ref 20–50)
IRON SERPL-MCNC: 106 UG/DL (ref 65–175)
LDLC SERPL CALC-MCNC: 73 MG/DL (ref 50–140)
LYMPHOCYTES # BLD AUTO: 1.51 X10(3)/MCL (ref 0.6–4.6)
LYMPHOCYTES NFR BLD AUTO: 20.9 %
MCH RBC QN AUTO: 32.1 PG (ref 27–31)
MCHC RBC AUTO-ENTMCNC: 33.2 G/DL (ref 33–36)
MCV RBC AUTO: 96.8 FL (ref 80–94)
MICROALBUMIN UR-MCNC: 7.2 UG/ML
MICROALBUMIN/CREAT RATIO PNL UR: 7.8 MG/GM CR (ref 0–30)
MONOCYTES # BLD AUTO: 0.5 X10(3)/MCL (ref 0.1–1.3)
MONOCYTES NFR BLD AUTO: 6.9 %
NEUTROPHILS # BLD AUTO: 4.64 X10(3)/MCL (ref 2.1–9.2)
NEUTROPHILS NFR BLD AUTO: 64.5 %
NRBC BLD AUTO-RTO: 0 %
PLATELET # BLD AUTO: 279 X10(3)/MCL (ref 130–400)
PMV BLD AUTO: 10.3 FL (ref 7.4–10.4)
POTASSIUM SERPL-SCNC: 4.1 MMOL/L (ref 3.5–5.1)
PROT SERPL-MCNC: 7.2 GM/DL (ref 5.8–7.6)
PSA SERPL-MCNC: 1.67 NG/ML
RBC # BLD AUTO: 4.39 X10(6)/MCL (ref 4.7–6.1)
SODIUM SERPL-SCNC: 139 MMOL/L (ref 136–145)
TIBC SERPL-MCNC: 223 UG/DL (ref 60–240)
TIBC SERPL-MCNC: 329 UG/DL (ref 250–450)
TRANSFERRIN SERPL-MCNC: 293 MG/DL (ref 163–344)
TRIGL SERPL-MCNC: 87 MG/DL (ref 34–140)
TSH SERPL-ACNC: 1.24 UIU/ML (ref 0.35–4.94)
VLDLC SERPL CALC-MCNC: 17 MG/DL
WBC # BLD AUTO: 7.21 X10(3)/MCL (ref 4.5–11.5)

## 2025-04-15 PROCEDURE — 36415 COLL VENOUS BLD VENIPUNCTURE: CPT

## 2025-04-15 PROCEDURE — 82728 ASSAY OF FERRITIN: CPT | Performed by: GENERAL PRACTICE

## 2025-04-15 PROCEDURE — 80053 COMPREHEN METABOLIC PANEL: CPT | Performed by: GENERAL PRACTICE

## 2025-04-15 PROCEDURE — 84153 ASSAY OF PSA TOTAL: CPT | Performed by: GENERAL PRACTICE

## 2025-04-15 PROCEDURE — 83540 ASSAY OF IRON: CPT | Performed by: GENERAL PRACTICE

## 2025-04-15 PROCEDURE — 84443 ASSAY THYROID STIM HORMONE: CPT | Performed by: GENERAL PRACTICE

## 2025-04-15 PROCEDURE — 82570 ASSAY OF URINE CREATININE: CPT | Performed by: GENERAL PRACTICE

## 2025-04-15 PROCEDURE — 80061 LIPID PANEL: CPT | Performed by: GENERAL PRACTICE

## 2025-04-15 PROCEDURE — 83036 HEMOGLOBIN GLYCOSYLATED A1C: CPT | Performed by: GENERAL PRACTICE

## 2025-04-15 PROCEDURE — 85025 COMPLETE CBC W/AUTO DIFF WBC: CPT | Performed by: GENERAL PRACTICE

## 2025-04-16 ENCOUNTER — OFFICE VISIT (OUTPATIENT)
Dept: NEUROSURGERY | Facility: CLINIC | Age: 70
End: 2025-04-16
Payer: MEDICARE

## 2025-04-16 ENCOUNTER — RESULTS FOLLOW-UP (OUTPATIENT)
Dept: PRIMARY CARE CLINIC | Facility: CLINIC | Age: 70
End: 2025-04-16

## 2025-04-16 VITALS
WEIGHT: 189 LBS | DIASTOLIC BLOOD PRESSURE: 87 MMHG | SYSTOLIC BLOOD PRESSURE: 130 MMHG | HEART RATE: 73 BPM | BODY MASS INDEX: 30.37 KG/M2 | HEIGHT: 66 IN

## 2025-04-16 DIAGNOSIS — M48.02 CERVICAL STENOSIS OF SPINAL CANAL: Primary | ICD-10-CM

## 2025-04-16 DIAGNOSIS — M47.812 MULTILEVEL CERVICAL SPONDYLOSIS WITHOUT MYELOPATHY: ICD-10-CM

## 2025-04-16 PROCEDURE — 3288F FALL RISK ASSESSMENT DOCD: CPT | Mod: CPTII,,, | Performed by: STUDENT IN AN ORGANIZED HEALTH CARE EDUCATION/TRAINING PROGRAM

## 2025-04-16 PROCEDURE — 3075F SYST BP GE 130 - 139MM HG: CPT | Mod: CPTII,,, | Performed by: STUDENT IN AN ORGANIZED HEALTH CARE EDUCATION/TRAINING PROGRAM

## 2025-04-16 PROCEDURE — 1159F MED LIST DOCD IN RCRD: CPT | Mod: CPTII,,, | Performed by: STUDENT IN AN ORGANIZED HEALTH CARE EDUCATION/TRAINING PROGRAM

## 2025-04-16 PROCEDURE — 3044F HG A1C LEVEL LT 7.0%: CPT | Mod: CPTII,,, | Performed by: STUDENT IN AN ORGANIZED HEALTH CARE EDUCATION/TRAINING PROGRAM

## 2025-04-16 PROCEDURE — 3066F NEPHROPATHY DOC TX: CPT | Mod: CPTII,,, | Performed by: STUDENT IN AN ORGANIZED HEALTH CARE EDUCATION/TRAINING PROGRAM

## 2025-04-16 PROCEDURE — 1160F RVW MEDS BY RX/DR IN RCRD: CPT | Mod: CPTII,,, | Performed by: STUDENT IN AN ORGANIZED HEALTH CARE EDUCATION/TRAINING PROGRAM

## 2025-04-16 PROCEDURE — 3061F NEG MICROALBUMINURIA REV: CPT | Mod: CPTII,,, | Performed by: STUDENT IN AN ORGANIZED HEALTH CARE EDUCATION/TRAINING PROGRAM

## 2025-04-16 PROCEDURE — 1125F AMNT PAIN NOTED PAIN PRSNT: CPT | Mod: CPTII,,, | Performed by: STUDENT IN AN ORGANIZED HEALTH CARE EDUCATION/TRAINING PROGRAM

## 2025-04-16 PROCEDURE — 99214 OFFICE O/P EST MOD 30 MIN: CPT | Mod: ,,, | Performed by: STUDENT IN AN ORGANIZED HEALTH CARE EDUCATION/TRAINING PROGRAM

## 2025-04-16 PROCEDURE — 1101F PT FALLS ASSESS-DOCD LE1/YR: CPT | Mod: CPTII,,, | Performed by: STUDENT IN AN ORGANIZED HEALTH CARE EDUCATION/TRAINING PROGRAM

## 2025-04-16 PROCEDURE — 3008F BODY MASS INDEX DOCD: CPT | Mod: CPTII,,, | Performed by: STUDENT IN AN ORGANIZED HEALTH CARE EDUCATION/TRAINING PROGRAM

## 2025-04-16 PROCEDURE — 3079F DIAST BP 80-89 MM HG: CPT | Mod: CPTII,,, | Performed by: STUDENT IN AN ORGANIZED HEALTH CARE EDUCATION/TRAINING PROGRAM

## 2025-04-16 NOTE — PROGRESS NOTES
Patient ID: [id number]     Chief Complaint: Medicare AWV Follow Up      HPI:     Pito Garcia is a 70 y.o. male here today for a Medicare Wellness. No other complaints today.       -------------------------------------    Anemia    Diabetes mellitus, type 2    Hypercholesterolemia    Hypertension        Past Surgical History:   Procedure Laterality Date    COLONOSCOPY      Dr High    HEMANGIOMA EXCISION      TONSILLECTOMY  ?    Very young    VASECTOMY  1983/1984       Review of patient's allergies indicates:   Allergen Reactions    Hydrocodone Nausea And Vomiting     Other reaction(s): emesis    Hydrocodone-ibuprofen Nausea Only       No outpatient medications have been marked as taking for the 4/17/25 encounter (Office Visit) with Scooter Michelle MD.       Social History[1]     Family History   Problem Relation Name Age of Onset    Diabetes Mother Kimberlee     Stroke Mother Kimberlee     Heart attack Brother      Early death Brother Brian         car accident        Patient Care Team:  Scooter Michelle MD as PCP - General (Family Medicine)  Josemanuel Felipe MD as Consulting Physician (Neurology)  Jason High MD as Consulting Physician (Gastroenterology)  Wilfred Dick Jr., MD as Consulting Physician (Physical Medicine and Rehabilitation)  Chastity Kathleen MD as Consulting Physician (Otolaryngology)  Yves Astorga LPN as Care Coordinator  Trinidad Jones MD as Consulting Physician (Ophthalmology)  Renita Jefferson MD as Consulting Physician (Cardiovascular Disease)     Opioid Screening: Patient medication list reviewed, patient is not taking prescription opioids. Patient is not using additional opioids than prescribed. Patient is at low risk of substance abuse based on this opioid use history.       Subjective:     Review of Systems   Constitutional: Negative.  Negative for malaise/fatigue and weight loss.   HENT: Negative.     Eyes: Negative.    Respiratory: Negative.  Negative for shortness of  breath.    Cardiovascular: Negative.  Negative for chest pain.   Gastrointestinal: Negative.    Genitourinary: Negative.    Musculoskeletal: Negative.    Skin: Negative.    Neurological: Negative.  Negative for focal weakness, weakness and headaches.   Endo/Heme/Allergies: Negative.    Psychiatric/Behavioral: Negative.  Negative for depression and memory loss. The patient is not nervous/anxious.          Patient Reported Health Risk Assessment  What [age] 70-79  Are you male or female?: Male  During the past four weeks, how much have you been bothered by emotional problems such as feeling anxious, depressed, irritable, sad, or downhearted and blue?: Not at all  During the past five weeks, has your physical and/or emotional health limited your social activities with family, friends, neighbors, or groups?: Not at all  During the past four weeks, how much bodily pain have you generally had?: No pain  During the past four weeks, was someone available to help if you needed and wanted help?: Yes, as much as I wanted  During the past four weeks, what was the hardest physical activity you could do for at least two minutes?: Light  Can you get to places out of walking distance without help?  (For example, can you travel alone on buses or taxis, or drive your own car?): Yes  Can you go shopping for groceries or clothes without someone's help?: Yes  Can you prepare your own meals?: Yes  Can you do your own housework without help?: Yes  Because of any health problems, do you need the help of another person with your personal care needs such as eating, bathing, dressing, or getting around the house?: No  Can you handle your own money without help?: Yes  During the past four weeks, how would you rate your health in general?: Good  How have things been going for you during the past four weeks?: Pretty well  Are you having difficulties driving your car?: No  Do you always fasten your seat belt when you are in a car?: Yes,  "usually  How often in the past four weeks have you been bothered by falling or dizzy when standing up?: Never  How often in the past four weeks have you been bothered by sexual problems?: Never  How often in the past four weeks have you been bothered by trouble eating well?: Never  How often in the past four weeks have you been bothered by teeth or denture problems?: Never  How often in the past four weeks have you been bothered with problems using the telephone?: Never  How often in the past four weeks have you been bothered by tiredness or fatigue?: Never  Have you fallen two or more times in the past year?: No  Are you afraid of falling?: No  Are you a smoker?: No  During the past four weeks, how many drinks of wine, beer, or other alcoholic beverages did you have?: One drink or less per week  Do you exercise for about 20 minutes three or more days a week?: No, I usually do not exercise this much  Have you been given any information to help you with hazards in your house that might hurt you?: No  Have you been given any information to help you with keeping track of your medications?: No  How often do you have trouble taking medicines the way you've been told to take them?: I always take them as prescribed  How confident are you that you can control and manage most of your health problems?: Very confident  What is your race? (Check all that apply.):     Objective:     /71   Pulse 65   Resp 18   Ht 5' 6" (1.676 m)   Wt 81.6 kg (180 lb)   SpO2 95%   BMI 29.05 kg/m²     Physical Exam  Constitutional:       Appearance: Normal appearance.   HENT:      Head: Normocephalic.      Mouth/Throat:      Mouth: Mucous membranes are moist.      Pharynx: Oropharynx is clear.   Eyes:      Conjunctiva/sclera: Conjunctivae normal.      Pupils: Pupils are equal, round, and reactive to light.   Cardiovascular:      Rate and Rhythm: Normal rate and regular rhythm.      Heart sounds: Normal heart sounds. "   Pulmonary:      Effort: Pulmonary effort is normal.      Breath sounds: Normal breath sounds.   Abdominal:      General: Abdomen is flat.      Palpations: Abdomen is soft.   Musculoskeletal:         General: Normal range of motion.      Cervical back: Neck supple.   Skin:     General: Skin is warm and dry.   Neurological:      General: No focal deficit present.      Mental Status: He is alert and oriented to person, place, and time.   Psychiatric:         Mood and Affect: Mood normal.         Behavior: Behavior normal.         Thought Content: Thought content normal.         Judgment: Judgment normal.         Lab Results   Component Value Date     04/15/2025    K 4.1 04/15/2025    CL 98 04/15/2025    CO2 31 04/15/2025    BUN 17.7 04/15/2025    CREATININE 0.80 04/15/2025    CALCIUM 9.3 04/15/2025    ALBUMIN 4.5 04/15/2025    BILITOT 0.6 04/15/2025    ALKPHOS 80 04/15/2025    AST 24 04/15/2025    ALT 18 04/15/2025    EGFRNORACEVR >60 04/15/2025     Lab Results   Component Value Date    WBC 7.21 04/15/2025    RBC 4.39 (L) 04/15/2025    HGB 14.1 04/15/2025    HCT 42.5 04/15/2025    MCV 96.8 (H) 04/15/2025    RDW 13.0 04/15/2025     04/15/2025      Lab Results   Component Value Date    CHOL 137 04/15/2025    TRIG 87 04/15/2025    HDL 47 04/15/2025    LDL 73.00 04/15/2025    TOTALCHOLEST 3 04/15/2025     Lab Results   Component Value Date    TSH 1.240 04/15/2025     Lab Results   Component Value Date    HGBA1C 5.6 04/15/2025        Lab Results   Component Value Date    PSA 1.67 04/15/2025              No data to display                  4/17/2025    10:00 AM 4/16/2025    10:30 AM 2/13/2025     9:00 AM 12/16/2024     9:30 AM 8/28/2024     9:00 AM 6/19/2024    10:30 AM 4/9/2024    10:30 AM   Fall Risk Assessment - Outpatient   Mobility Status Ambulatory Ambulatory Ambulatory Ambulatory Ambulatory Ambulatory Ambulatory   Number of falls 0 0 0 0 0 0 0   Identified as fall risk False False False False False  False False           Depression Screening  Over the past two weeks, has the patient felt down, depressed, or hopeless?: No  Over the past two weeks, has the patient felt little interest or pleasure in doing things?: No  Functional Ability/Safety Screening  Was the patient's timed Up & Go test unsteady or longer than 30 seconds?: No  Does the patient need help with phone, transportation, shopping, preparing meals, housework, laundry, meds, or managing money?: No  Does the patient's home have rugs in the hallway, lack grab bars in the bathroom, lack handrails on the stairs or have poor lighting?: No  Have you noticed any hearing difficulties?: No  Cognitive Function (Assessed through direct observation with due consideration of information obtained by way of patient reports and/or concerns raised by family, friends, caretakers, or others)    Does the patient repeat questions/statements in the same day?: No  Does the patient have trouble remembering the date, year, and time?: No  Does the patient have difficulty managing finances?: No  Does the patient have a decreased sense of direction?: No  Assessment:       ICD-10-CM ICD-9-CM   1. Medicare annual wellness visit, subsequent  Z00.00 V70.0   2. Screening for prostate cancer  Z12.5 V76.44   3. Primary hypertension  I10 401.9   4. Type 2 diabetes mellitus without complication, without long-term current use of insulin  E11.9 250.00   5. Dyslipidemia  E78.5 272.4   6. JOE (generalized anxiety disorder)  F41.1 300.02   7. Iron deficiency anemia secondary to inadequate dietary iron intake  D50.8 280.1   8. Overweight (BMI 25.0-29.9)  E66.3 278.02        Plan:     Medicare Annual Wellness and Personalized Prevention Plan:   Fall Risk + Home Safety + Hearing Impairment + Depression Screen + Cognitive Impairment Screen + Health Risk Assessment all reviewed.       Health Maintenance Topics with due status: Not Due       Topic Last Completion Date    Colorectal Cancer Screening  02/21/2023    Foot Exam 10/16/2024    Diabetic Eye Exam 10/30/2024    Diabetes Urine Screening 04/15/2025    Lipid Panel 04/15/2025    Hemoglobin A1c 04/15/2025    Low Dose Statin 04/16/2025      The patient's Health Maintenance was reviewed and the following appears to be due at this time:   Health Maintenance Due   Topic Date Due    TETANUS VACCINE  Never done    Shingles Vaccine (1 of 2) Never done    RSV Vaccine (Age 60+ and Pregnant patients) (1 - Risk 60-74 years 1-dose series) Never done    Abdominal Aortic Aneurysm Screening  Never done     Health risk assessment:  After review of the patient's medical record as it relates to health risk assessment over the next 5-10 years per recommendations of the [place]PSTF, it was determined that all pertinent recommendations have been appropriately addressed. The patient does not desire any further preventative care measures or screening tests at this time.  We will continue to reassess at each annual visit.    1. Medicare annual wellness visit, subsequent  Comments:  Overall health status was reviewed.  Good health habits reinforced.  Annual wellness exams recommended.    2. Screening for prostate cancer  Comments:  Prostate specific antigen blood test obtained was essentially normal, suggesting that there is no current concern for prostate cancer.  Digital exam deferred.    3. Primary hypertension  Overview:  Prescribed lisinopril 10 mg daily amlodipine 5 mg daily, hydrochlorothiazide 25 mg daily.    Blood pressures appear to be adequately controlled with current treatment plan, including prescription blood pressure medication.  Medication(s) appear to be effective at current dosages, and are not causing any side effects or problems.  Continue medical management and continue home blood pressure checks.  Average blood pressures at home should be no greater than 130/80.  Patient instructed to contact the clinic if blood pressures become elevated at any point prior to next  clinic visit.    Medication will be continued at the current dosage.      4. Type 2 diabetes mellitus without complication, without long-term current use of insulin  Overview:  Prescribed metformin 1000 mg b.i.d..    Most recent hemoglobin A1c shows good control on diabetic treatment plan, including diabetic prescription medication.  Continue current treatment plan, medical regimen, and lifestyle modification as discussed at this visit and during previous visits.    Medication will be continued at current dosage.    Assessment & Plan:            Component  Ref Range & Units (hover) 1 d ago 1 yr ago 2 yr ago 3 yr ago 4 yr ago 5 yr ago 6 yr ago   Hemoglobin A1c 5.6 5.9 6.1 5.5 R 5.6 R 6.3 R 5.9 R   Estimated Average Glucose 114.0 122.6 128.4 111.2 R 114.0 134 123            5. Dyslipidemia  Overview:  Prescribed Lipitor 40 mg daily.    Most recent cholesterol/lipid blood testing shows adequate control, continue current treatment plan, including prescription medications if prescribed, and/or diet high in fiber, low in saturated fats as discussed during clinic visit.    Medication will be continued at current dosage.      6. JOE (generalized anxiety disorder)  Overview:  Prescribed sertraline 100 mg daily.    Patient reports stability of moods, and effectiveness of medications, including no agitation, significant somnolence, or significant bouts of anxiety or depression.  Patient is not having any sleep disturbance.  Current treatment plan will continue, with plans to discuss any significant changes in patient's status if necessary if anything changes in the future..    Medication will be continued at current dosage.      7. Iron deficiency anemia secondary to inadequate dietary iron intake  Overview:  Patient has a diagnosis of iron-deficiency anemia.  Iron supplementation appears to be effective, currently once a day, he will be starting it twice a day, watching for a potential interactions between other medications,  and we will continue to monitor his blood counts regularly, probably annually if they normalize.    Notably, he is up-to-date on his colonoscopy/colon cancer screening.      8. Overweight (BMI 25.0-29.9)  Overview:  Weight loss, healthy dietary choices, increased activity and exercise are all recommended.              Advance Care Planning     Date: 04/16/2025    Living Will  During this visit, I engaged the patient  in the voluntary advance care planning process.  The patient and I reviewed the role for advance directives and their purpose in directing future healthcare if the patient's unable to speak for him/herself.  At this point in time, the patient does have full decision-making capacity.  We discussed different extreme health states that he could experience, and reviewed what kind of medical care he would want in those situations.  The patient communicated that if he were comatose and had little chance of a meaningful recovery, he would not want machines/life-sustaining treatments used. In addition to the above preference, other important end-of-life issues for the patient include no other issues. The patient has completed a living will to reflect these preferences.  I spent a total of 5 minutes engaging the patient in this advance care planning discussion.              Current Outpatient Medications   Medication Instructions    amLODIPine (NORVASC) 5 mg, Oral, Daily    atorvastatin (LIPITOR) 40 mg, Oral, Daily    calcium carbonate (CALCIUM 600 ORAL) 1 tablet, Daily    calcium citrate-vitamin D3 315-200 mg (CITRACAL+D) 315 mg-5 mcg (200 unit) per tablet 1 tablet, 2 times daily    clonazePAM (KLONOPIN) 1 mg, Oral, Nightly    co-enzyme Q-10 30 mg, Daily    docosahexaenoic acid/epa (FISH OIL ORAL)     ferrous sulfate (FEOSOL) Tab tablet 1 tablet, With breakfast    hydroCHLOROthiazide (HYDRODIURIL) 25 mg, Oral, Daily    MAGNESIUM GLYCINATE ORAL Oral    MEN'S MULTI-VITAMIN ORAL 1 tablet, Daily    metFORMIN  (GLUCOPHAGE) 1,000 mg, Oral, 2 times daily    omega-3 fatty acids 1,000 mg Cap 1 capsule, Daily    omeprazole (PRILOSEC) 20 mg    sertraline (ZOLOFT) 100 mg, Oral, Daily        Follow up in about 6 months (around 10/22/2025) for Chronic condition F/up. In addition to their scheduled follow up, the patient has also been instructed to follow up on as needed basis.     This note was created with the assistance of [employer] voice recognition software or phone dictation. There may be transcription errors as a result of using this technology. Effort has been made to assure accuracy of transcription but any obvious errors or omissions should be clarified with the author of the document.         [1]   Social History  Socioeconomic History    Marital status:    Tobacco Use    Smoking status: Former     Current packs/day: 0.00     Average packs/day: 0.3 packs/day for 3.0 years (0.8 ttl pk-yrs)     Types: Cigarettes     Quit date:      Years since quittin.3    Smokeless tobacco: Never    Tobacco comments:     Late teens   Substance and Sexual Activity    Alcohol use: Yes     Alcohol/week: 1.0 standard drink of alcohol     Types: 1 Cans of beer per week     Comment: May go weeks without    Drug use: Not Currently     Types: Marijuana     Comment: Late teens    Sexual activity: Not Currently     Partners: Female     Birth control/protection: See Surgical Hx     Social Drivers of Health     Financial Resource Strain: Low Risk  (4/15/2025)    Overall Financial Resource Strain (CARDIA)     Difficulty of Paying Living Expenses: Not hard at all   Food Insecurity: No Food Insecurity (4/15/2025)    Hunger Vital Sign     Worried About Running Out of Food in the Last Year: Never true     Ran Out of Food in the Last Year: Never true   Transportation Needs: No Transportation Needs (4/15/2025)    PRAPARE - Transportation     Lack of Transportation (Medical): No     Lack of Transportation (Non-Medical): No   Physical Activity:  Inactive (4/15/2025)    Exercise Vital Sign     Days of Exercise per Week: 0 days     Minutes of Exercise per Session: 0 min   Stress: No Stress Concern Present (4/15/2025)    [employer] - Occupational Stress Questionnaire     Feeling of Stress : Not at all   Housing Stability: Low Risk  (4/15/2025)    Housing Stability Vital Sign     Unable to Pay for Housing in the Last Year: No     Number of Times Moved in the Last Year: 0     Homeless in the Last Year: No

## 2025-04-16 NOTE — PROGRESS NOTES
[facility]  Follow up visit   Neurosurgery      Pito Garcia   [id number]   1955       SUBJECTIVE:     CHIEF COMPLAINT:  Neck pain    HPI:  Pito Garcia is a 69 y.o. male who presents for follow up appointment.   He was last seen in office on 2025.    He complains of worsening chronic neck pain.  Rates pain at 6/10 today.  Mostly axial and occasionally radiates down to shoulders.  Denies any pain radiating down his arms/hands.  In the past, pain radiated down the back of his head and caused headaches.  That has improved with the use of BC powder.    He continues to take this as well as Tylenol as needed.  Robaxin was recommended during his last visit.  Provides slight relief.  No difficulties with fine motor skills or balance.  During his last visit, up-to-date MRI cervical spine as well as flexion-extension x-rays and CT were recommended.  He is here today to review results.    Past Medical History:   Diagnosis Date    Anemia     Diabetes mellitus, type 2     Hypercholesterolemia     Hypertension        Past Surgical History:   Procedure Laterality Date    COLONOSCOPY      Dr High    HEMANGIOMA EXCISION      TONSILLECTOMY      VASECTOMY         Family History   Problem Relation Name Age of Onset    Diabetes Mother Kimberlee     Stroke Mother Kimberlee     Heart attack Brother      Early death Brother Brian         car accident       Social History     Socioeconomic History    Marital status:    Tobacco Use    Smoking status: Former     Current packs/day: 0.00     Average packs/day: 0.3 packs/day for 3.0 years (0.8 ttl pk-yrs)     Types: Cigarettes     Quit date:      Years since quittin.3    Smokeless tobacco: Never    Tobacco comments:     Late teens   Substance and Sexual Activity    Alcohol use: Yes     Alcohol/week: 1.0 standard drink of alcohol     Types: 1 Cans of beer per week     Comment: May go weeks without    Drug use: Not Currently     Types:  Marijuana     Comment: Late teens    Sexual activity: Not Currently     Partners: Female     Birth control/protection: See Surgical Hx     Social Drivers of Health     Financial Resource Strain: Low Risk  (4/15/2025)    Overall Financial Resource Strain (CARDIA)     Difficulty of Paying Living Expenses: Not hard at all   Food Insecurity: No Food Insecurity (4/15/2025)    Hunger Vital Sign     Worried About Running Out of Food in the Last Year: Never true     Ran Out of Food in the Last Year: Never true   Transportation Needs: No Transportation Needs (4/15/2025)    PRAPARE - Transportation     Lack of Transportation (Medical): No     Lack of Transportation (Non-Medical): No   Physical Activity: Inactive (4/15/2025)    Exercise Vital Sign     Days of Exercise per Week: 0 days     Minutes of Exercise per Session: 0 min   Stress: No Stress Concern Present (4/15/2025)    [employer] - Occupational Stress Questionnaire     Feeling of Stress : Not at all   Housing Stability: Low Risk  (4/15/2025)    Housing Stability Vital Sign     Unable to Pay for Housing in the Last Year: No     Number of Times Moved in the Last Year: 0     Homeless in the Last Year: No       Review of patient's allergies indicates:   Allergen Reactions    Hydrocodone Nausea And Vomiting     Other reaction(s): emesis    Hydrocodone-ibuprofen Nausea Only        Current Outpatient Medications   Medication Instructions    amLODIPine (NORVASC) 5 mg, Oral, Daily    atorvastatin (LIPITOR) 40 mg, Oral, Daily    calcium carbonate (CALCIUM 600 ORAL) 1 tablet, Daily    calcium citrate-vitamin D3 315-200 mg (CITRACAL+D) 315 mg-5 mcg (200 unit) per tablet 1 tablet, 2 times daily    clonazePAM (KLONOPIN) 1 mg, Oral, Nightly    co-enzyme Q-10 30 mg, Daily    docosahexaenoic acid/epa (FISH OIL ORAL)     ferrous sulfate (FEOSOL) Tab tablet 1 tablet, With breakfast    hydroCHLOROthiazide (HYDRODIURIL) 25 mg, Oral, Daily    MEN'S  "MULTI-VITAMIN ORAL 1 tablet, Daily    metFORMIN (GLUCOPHAGE) 1,000 mg, Oral, 2 times daily    omega-3 fatty acids 1,000 mg Cap 1 capsule, Daily    omeprazole (PRILOSEC) 20 mg    sertraline (ZOLOFT) 100 mg, Oral, Daily     Review of Systems   12 point review of systems conducted, negative except as stated in the history of present illness. See HPI for details.    OBJECTIVE:     Visit Vitals  /87 (BP Location: Right arm, Patient Position: Sitting)   Pulse 73   Ht 5' 6" (1.676 m)   Wt 85.7 kg (189 lb)   BMI 30.51 kg/m²      General:  Pleasant, Well-nourished, Well-groomed.    Lungs:  Breathing is quiet with non-labored respirations.    Musculoskeletal:  Cervical ROM: slightly decreased due to paraspinal pain/spasm  Tinel's is negative at bilateral wrist and elbows.    Neurological:    Alert and oriented to person, place, time, and situation.  Muscle strength against resistance:  Strength  Deltoids Triceps Biceps Wrist Extension Wrist Flexion Intrinsics   Upper: R 5/5 5/5 5/5 5/5  5/5    L 5/5 5/5 5/5 5/5  5/5     Iliopsoas Quadriceps Knee  Flexion Tibialis  anterior Gastro- cnemius EHL   Lower: R 5/5 5/5 5/5 5/5 5/5 5/5    L 5/5 5/5 5/5 5/5 5/5 5/5   Sensation is intact to primary modalities in bilateral upper extremities.  Reflexes:   Right Left   Triceps 2+ 2+   Biceps 2+ 2+   Brachioradialis 2+ 2+   Patellar 2+ 2+   Achilles 2+ 2+   Graham's sign is negative bilaterally.  Gait is normal.     Imaging:  All pertinent neuroimaging independently reviewed. Discussed these findings in detail with the patient.    Flexion-extension x-rays of his cervical spine  Chronic diffuse cervical degenerative disease which is most advanced at C4-C7.  There has been little interval change since the radiographs 08/28/2024.  No abnormal segmental motion or acute pathology identified.     CT cervical spine demonstrates multilevel degenerative changes  Lovel by level as follows:  C2-C3 advanced right facet arthropathy, with small " chronic fracture deformity involving the right C3 superior articular process anteriorly.  C3-C4 grade 1 anterolisthesis with moderate left facet arthropathy.  C4-C5 advanced disc disease, with moderate right intervertebral foraminal stenosis.  C5-C6 advanced disc disease, with severe spinal canal stenosis and moderate bilateral intervertebral foraminal stenosis.  C6-C7 advanced disc disease, with moderate left intervertebral foraminal stenosis.  C7-T1 advanced right facet arthropathy.    MRI cervical spine demonstrates  Chronic diffuse cervical degenerative disc disease, uncovertebral degeneration, and facet arthrosis as detailed above without acute pathology identified.  Slight degenerative anterolisthesis of C3 along with C7 and T1.  Multilevel cervical spinal canal stenosis which is most advanced and severe at C5-C6 with associated mild cord impingement with no appreciable cord edema or myelomalacia.  Moderate to severe right neural foramen stenosis at C2-C3, mild bilateral neural foramen stenosis at C3-C4, severe bilateral neural foramen stenosis at C5-C6, and severe bilateral proximal neural foramen stenosis at C6-C7.    ASSESSMENT:       ICD-10-CM ICD-9-CM   1. Cervical stenosis of spinal canal  M48.02 723.0   2. Multilevel cervical spondylosis without myelopathy  M47.812 721.0     PLAN:     69 year-old male with history of hypertension, hyperlipidemia, diabetes presents with chronic neck pain due to cervical spondylosis.  No significant radicular symptoms or symptoms/signs of myelopathy.  Pain is mostly axial and I explained likely related to facet arthropathy+ muscle spasms.  We discussed symptoms and imaging findings.  He does have multilevel changes.  However, given nature of his symptoms at this point I do not believe surgical intervention is warranted.  He will continue symptomatic management with muscle relaxants p.r.n..  He will also discuss possible facet blocks/ablation with interventional pain  management.  If he ever develops radicular symptoms and or issues with fine motor skills/balance he knows to call the office.  We will be seeing him on an as-needed basis.    Mitch Medina MD  Neurosurgery  [facility]     30 minutes were personally spent on this visit including my review of available records, prior imaging, comprehensive physical and neurologic examination and discussion with the patient.     Disclaimer:  This note is prepared using voice recognition software and as such is likely to have errors despite attempts at proofreading.

## 2025-04-16 NOTE — ASSESSMENT & PLAN NOTE
Component  Ref Range & Units (hover) 1 d ago 1 yr ago 2 yr ago 3 yr ago 4 yr ago 5 yr ago 6 yr ago   Hemoglobin A1c 5.6 5.9 6.1 5.5 R 5.6 R 6.3 R 5.9 R   Estimated Average Glucose 114.0 122.6 128.4 111.2 R 114.0 134 123

## 2025-04-17 ENCOUNTER — OFFICE VISIT (OUTPATIENT)
Dept: PRIMARY CARE CLINIC | Facility: CLINIC | Age: 70
End: 2025-04-17
Payer: MEDICARE

## 2025-04-17 VITALS
HEART RATE: 65 BPM | RESPIRATION RATE: 18 BRPM | BODY MASS INDEX: 28.93 KG/M2 | HEIGHT: 66 IN | SYSTOLIC BLOOD PRESSURE: 117 MMHG | DIASTOLIC BLOOD PRESSURE: 71 MMHG | WEIGHT: 180 LBS | OXYGEN SATURATION: 95 %

## 2025-04-17 DIAGNOSIS — Z00.00 MEDICARE ANNUAL WELLNESS VISIT, SUBSEQUENT: Primary | ICD-10-CM

## 2025-04-17 DIAGNOSIS — F41.1 GAD (GENERALIZED ANXIETY DISORDER): Chronic | ICD-10-CM

## 2025-04-17 DIAGNOSIS — E11.9 TYPE 2 DIABETES MELLITUS WITHOUT COMPLICATION, WITHOUT LONG-TERM CURRENT USE OF INSULIN: Chronic | ICD-10-CM

## 2025-04-17 DIAGNOSIS — D50.8 IRON DEFICIENCY ANEMIA SECONDARY TO INADEQUATE DIETARY IRON INTAKE: Chronic | ICD-10-CM

## 2025-04-17 DIAGNOSIS — Z12.5 SCREENING FOR PROSTATE CANCER: ICD-10-CM

## 2025-04-17 DIAGNOSIS — I10 PRIMARY HYPERTENSION: Chronic | ICD-10-CM

## 2025-04-17 DIAGNOSIS — E78.5 DYSLIPIDEMIA: Chronic | ICD-10-CM

## 2025-04-17 DIAGNOSIS — E66.3 OVERWEIGHT (BMI 25.0-29.9): Chronic | ICD-10-CM

## 2025-05-22 DIAGNOSIS — I10 HYPERTENSION, UNSPECIFIED TYPE: Primary | ICD-10-CM

## 2025-05-22 RX ORDER — HYDROCHLOROTHIAZIDE 25 MG/1
25 TABLET ORAL DAILY
Qty: 90 TABLET | Refills: 3 | Status: SHIPPED | OUTPATIENT
Start: 2025-05-22

## 2025-05-30 ENCOUNTER — TELEPHONE (OUTPATIENT)
Dept: PRIMARY CARE CLINIC | Facility: CLINIC | Age: 70
End: 2025-05-30
Payer: MEDICARE

## 2025-05-30 NOTE — TELEPHONE ENCOUNTER
Copied from CRM #[id number]. Topic: General Inquiry - Patient Advice  >> May 30, 2025 10:37 AM Brittany wrote:  Who Called: Pito Garcia    Refill or New Rx:New Rx    RX Name and Strength: METHOCArbamol  ??mg    How is the patient currently taking it? (ex. 1XDay):N/A    Is this a 30 day or 90 day RX: N/A    Local or Mail Order: LOCAL    List of preferred pharmacies on file (remove unneeded): [facility] - [place], [place] - [address]   Phone: [phone]  Fax: [phone]    Ordering Provider: AGUSTO    Preferred Method of Contact: Phone Call    Patient's Preferred Phone Number on File: [phone]     Best Call Back Number, if different: n/a    Additional Information: pt states Neurosurgeon said pt is not a candidate for surgery on neck - arthritis, but pt is having muscle pain along with arthritis pain.   Pt states he tried his spouses METHOCArbamol 750mg and he took 2 at a time with some relief.     Please advise pt

## 2025-06-10 ENCOUNTER — OFFICE VISIT (OUTPATIENT)
Dept: PRIMARY CARE CLINIC | Facility: CLINIC | Age: 70
End: 2025-06-10
Payer: MEDICARE

## 2025-06-10 DIAGNOSIS — M54.2 NECK PAIN: Primary | Chronic | ICD-10-CM

## 2025-06-10 DIAGNOSIS — G89.29 CHRONIC NECK PAIN: ICD-10-CM

## 2025-06-10 DIAGNOSIS — M54.2 CHRONIC NECK PAIN: ICD-10-CM

## 2025-06-10 RX ORDER — METHOCARBAMOL 750 MG/1
1500 TABLET, FILM COATED ORAL 3 TIMES DAILY
Qty: 180 TABLET | Refills: 5 | Status: SHIPPED | OUTPATIENT
Start: 2025-06-10 | End: 2025-12-07

## 2025-06-10 NOTE — PROGRESS NOTES
Subjective:       Patient ID: Pito Garcia is a 70 y.o. male.    Chief Complaint: No chief complaint on file.    Established patient, presents to the clinic to discuss starting methocarbamol for neck pain.  Visit is done through virtual means.  Patient has chronic neck pain, not a surgical candidate, widespread arthritis, degenerative disc disease, and muscle pain and spasm.  He has tried many treatment modalities, of those Robaxin 750 mg, two tablets seems to work the best with the least amount of side effects.  No peripheral arm pain, no other significant symptoms.    I have reviewed the patient's name and date of birth.  I verbally reviewed the patient's past medical history, active medication list and allergy list.  I will also verify the patient's identity with a picture ID if necessary.  I have verified that the patient is in the [facility].  The patient has consented to be treated remotely by virtual appointment via [facility] approved interactive audiovisual format.  The patient has access to a working audiovisual format device.    Originating site (whether patient is located): home  Distant site (whether provider is located):  [facility]    Back Pain  This is a chronic problem. The current episode started more than 1 year ago. The problem occurs constantly. The problem has been gradually worsening since onset. The pain is present in the thoracic spine. The quality of the pain is described as aching, burning, shooting and stabbing. The pain does not radiate. The pain is at a severity of 9/10. The pain is moderate. The pain is The same all the time. The symptoms are aggravated by position and lying down. Stiffness is present All day. Associated symptoms include headaches. Pertinent negatives include no chest pain, fever, numbness or weakness. Risk factors include sedentary lifestyle. The treatment provided no relief.     Review of Systems   Constitutional: Negative.  Negative  for fever.   Respiratory: Negative.  Negative for shortness of breath.    Cardiovascular: Negative.  Negative for chest pain.   Musculoskeletal:  Positive for neck pain. Negative for back pain.   Integumentary:  Negative for rash.   Neurological:  Positive for headaches. Negative for weakness, numbness and coordination difficulties.           Patient Care Team:  Scooter Michelle MD as PCP - General (Family Medicine)  Felipe, Josemanuel DAS MD as Consulting Physician (Neurology)  Jason High MD as Consulting Physician (Gastroenterology)  Wilfred Dick Jr., MD as Consulting Physician (Physical Medicine and Rehabilitation)  Chastity Kathleen MD as Consulting Physician (Otolaryngology)  Yves Astorga LPN as Care Coordinator  Trinidad Jones MD as Consulting Physician (Ophthalmology)  Renita Jefferson MD as Consulting Physician (Cardiovascular Disease)  Objective:   There were no vitals filed for this visit.There is no height or weight on file to calculate BMI.     Physical Exam  Constitutional:       Appearance: Normal appearance.   Psychiatric:         Mood and Affect: Mood normal.         Behavior: Behavior normal.         Thought Content: Thought content normal.           Assessment:       ICD-10-CM ICD-9-CM   1. Neck pain  M54.2 723.1   2. Chronic neck pain  M54.2 723.1    G89.29 338.29       Current Outpatient Medications   Medication Instructions    amLODIPine (NORVASC) 5 mg, Oral, Daily    atorvastatin (LIPITOR) 40 mg, Oral, Daily    calcium carbonate (CALCIUM 600 ORAL) 1 tablet, Daily    calcium citrate-vitamin D3 315-200 mg (CITRACAL+D) 315 mg-5 mcg (200 unit) per tablet 1 tablet, 2 times daily    clonazePAM (KLONOPIN) 1 mg, Oral, Nightly    co-enzyme Q-10 30 mg, Daily    docosahexaenoic acid/epa (FISH OIL ORAL)     ferrous sulfate (FEOSOL) Tab tablet 1 tablet, With breakfast    hydroCHLOROthiazide (HYDRODIURIL) 25 mg, Oral, Daily    MAGNESIUM GLYCINATE ORAL Oral    MEN'S MULTI-VITAMIN ORAL 1  tablet, Daily    metFORMIN (GLUCOPHAGE) 1,000 mg, Oral, 2 times daily    methocarbamoL (ROBAXIN) 1,500 mg, Oral, 3 times daily    omega-3 fatty acids 1,000 mg Cap 1 capsule, Daily    omeprazole (PRILOSEC) 20 mg    sertraline (ZOLOFT) 100 mg, Oral, Daily     Plan:     Problem List Items Addressed This Visit          Orthopedic    Chronic neck pain - Primary    Overview   Chronic neck pain and spasm, muscular in nature, along with degenerative disc disease who and arthritis.  Methocarbamol has worked very well, without any significant problems or side effects.    Prescribed methocarbamol 750 mg two tablets as needed t.i.d. p.r.n. neck pain.         Relevant Medications    methocarbamoL (ROBAXIN) 750 MG Tab           I spent 15 minutes on this clinical visit, over half of that time face-to-face with the patient discussing the particular diagnosis and the importance of compliance with the treatment plan.    Follow up for next appointment as scheduled or as needed.    This note was created with the assistance of [employer] voice recognition software or phone dictation. There may be transcription errors as a result of using this technology. Effort has been made to assure accuracy of transcription but any obvious errors or omissions should be clarified with the author of the document.    Answers submitted by the patient for this visit:  Back Pain Questionnaire (Submitted on 6/6/2025)  Chief Complaint: Back pain

## 2025-06-16 ENCOUNTER — OFFICE VISIT (OUTPATIENT)
Dept: NEUROLOGY | Facility: CLINIC | Age: 70
End: 2025-06-16
Payer: MEDICARE

## 2025-06-16 VITALS
BODY MASS INDEX: 28.93 KG/M2 | DIASTOLIC BLOOD PRESSURE: 70 MMHG | SYSTOLIC BLOOD PRESSURE: 124 MMHG | HEIGHT: 66 IN | WEIGHT: 180 LBS

## 2025-06-16 DIAGNOSIS — G47.52 REM SLEEP BEHAVIOR DISORDER: Primary | Chronic | ICD-10-CM

## 2025-06-16 PROCEDURE — 99999 PR PBB SHADOW E&M-EST. PATIENT-LVL IV: CPT | Mod: PBBFAC,,, | Performed by: NURSE PRACTITIONER

## 2025-06-16 PROCEDURE — 1101F PT FALLS ASSESS-DOCD LE1/YR: CPT | Mod: CPTII,S$GLB,, | Performed by: NURSE PRACTITIONER

## 2025-06-16 PROCEDURE — 3008F BODY MASS INDEX DOCD: CPT | Mod: CPTII,S$GLB,, | Performed by: NURSE PRACTITIONER

## 2025-06-16 PROCEDURE — 3078F DIAST BP <80 MM HG: CPT | Mod: CPTII,S$GLB,, | Performed by: NURSE PRACTITIONER

## 2025-06-16 PROCEDURE — 1160F RVW MEDS BY RX/DR IN RCRD: CPT | Mod: CPTII,S$GLB,, | Performed by: NURSE PRACTITIONER

## 2025-06-16 PROCEDURE — 3044F HG A1C LEVEL LT 7.0%: CPT | Mod: CPTII,S$GLB,, | Performed by: NURSE PRACTITIONER

## 2025-06-16 PROCEDURE — 3066F NEPHROPATHY DOC TX: CPT | Mod: CPTII,S$GLB,, | Performed by: NURSE PRACTITIONER

## 2025-06-16 PROCEDURE — 1159F MED LIST DOCD IN RCRD: CPT | Mod: CPTII,S$GLB,, | Performed by: NURSE PRACTITIONER

## 2025-06-16 PROCEDURE — 1125F AMNT PAIN NOTED PAIN PRSNT: CPT | Mod: CPTII,S$GLB,, | Performed by: NURSE PRACTITIONER

## 2025-06-16 PROCEDURE — 3288F FALL RISK ASSESSMENT DOCD: CPT | Mod: CPTII,S$GLB,, | Performed by: NURSE PRACTITIONER

## 2025-06-16 PROCEDURE — 99214 OFFICE O/P EST MOD 30 MIN: CPT | Mod: S$GLB,,, | Performed by: NURSE PRACTITIONER

## 2025-06-16 PROCEDURE — 3074F SYST BP LT 130 MM HG: CPT | Mod: CPTII,S$GLB,, | Performed by: NURSE PRACTITIONER

## 2025-06-16 PROCEDURE — 3061F NEG MICROALBUMINURIA REV: CPT | Mod: CPTII,S$GLB,, | Performed by: NURSE PRACTITIONER

## 2025-06-16 RX ORDER — CLONAZEPAM 1 MG/1
1 TABLET ORAL NIGHTLY
Qty: 90 TABLET | Refills: 1 | Status: SHIPPED | OUTPATIENT
Start: 2025-06-16

## 2025-06-16 RX ORDER — CLONAZEPAM 1 MG/1
1 TABLET ORAL NIGHTLY
Qty: 90 TABLET | Refills: 1 | Status: SHIPPED | OUTPATIENT
Start: 2025-06-16 | End: 2025-06-16 | Stop reason: SDUPTHER

## 2025-06-16 NOTE — PATIENT INSTRUCTIONS
"REMBD    Rapid eye movement (REM) sleep behavior disorder (RBD) is a parasomnia characterized by dream-enactment behaviors that emerge during a loss of REM sleep atonia. RBD dream enactment ranges in severity from benign hand gestures to violent thrashing, punching, and kicking. Patients typically present to medical attention with a concern related to injurious or potentially injurious actions to themselves and/or their bed partner.  In spontaneously occurring cases, RBD is a prodromal syndrome of alpha-synuclein neurodegeneration. Thus, the vast majority of RBD patients will eventually demonstrate signs and symptoms of Parkinson disease (PD) or a related disorder (eg, multiple system atrophy or dementia with Lewy bodies), often after a prolonged interval. Prior to the emergence of a parkinsonian syndrome, patients may have subtle sensory, motor, and cognitive deficits, including anosmia and constipation, consistent with an impending neurologic disorder.  A careful history should distinguish RBD from related parasomnias such as sleepwalking. In-laboratory video polysomnography can exclude other sleep disorders, quantify REM atonia and capture dream-enactment behaviors, confirming the diagnosis. The main focus of treatment is to reduce behavioral events and prevent sleep-related injury; this can be achieved through changes in the sleeping environment and, if necessary, pharmacotherapy (eg, melatonin, clonazepam).  This topic will review the clinical features, evaluation, and management of RBD in adults. An approach to abnormal movements and behaviors during sleep and other specific parasomnias in adults are discussed separately. (See "Approach to abnormal movements and behaviors during sleep" and "Disorders of arousal from non-rapid eye movement sleep in adults" and "Nightmares and nightmare disorder in adults".)  EPIDEMIOLOGY   The prevalence of rapid eye movement (REM) sleep behavior disorder (RBD) is approximately " "0.5 to 1.5 percent in the general population and approximately 2 percent in older adults [1-7]. These estimates are based on community-based studies with polysomnography (PSG) confirmation conducted in [place], [place], [place], [place], and [place]. Although this translates to 40 to 100 million expected patients worldwide, the vast majority of cases go unrecognized [5].  Among younger adults (<40 years old), RBD most frequently occurs in the setting of antidepressant medication exposure or narcolepsy [8,9]. RBD is rare among children, with most cases being associated with narcolepsy type 1, antidepressant medication use, and rarely with brainstem tumors and various neuropsychiatric disorders [9-11]. Occasionally, there can be isolated dream enactment behavior or dream enactment with a non-REM parasomnia such as sleepwalking [12,13]. (See "Parasomnias of childhood, including sleepwalking".)  Since isolated RBD is a prodromal syndrome of alpha-synuclein neuropathology, it is widespread among patients with Parkinson disease (PD) (33 to 50 percent), multiple system atrophy (80 to 95 percent), and dementia with Lewy bodies (80 percent) [8,14-18]. Environmental and behavioral risk factors for RBD largely overlap with those for PD. Patients with confirmed or possible RBD are more likely to smoke; have fewer years of education; report heavy alcohol use; have a concurrent mood disorder; use antidepressants; and report a history of traumatic brain injury, welding work, and pesticide exposure [19-22].  RBD has a strong male predominance in published series (as high as 9:1 male to female ratio), but female cases are likely underreported and underdiagnosed [2]. Much of the discrepancy may be due to referral bias, since males often have more aggressive and violent RBD episodes compared with females [2,23]. Female patients with RBD are also younger and more commonly present in the setting of associated autoimmune disorders or " serotonergic RBD [24]. Among older adult females, RBD may be less likely to be witnessed, as females more often outlive their male bed partners [25].  PATHOGENESIS   Normal rapid eye movement sleep on polysomnography  Figure 1  Physiologically, rapid eye movement (REM) sleep is associated with skeletal muscle atonia, producing paralysis (figure 1). This confers a protective measure, preventing people from acting what they dream in the setting of dream mentation. Quiescence of motor activity during REM sleep may also facilitate sleep-related memory consolidation.  The physiologic suppression of motor activity during REM sleep is the cumulative result of multiple neuronal circuits that predominantly originate in the lashonda and ultimately terminate on spinal cord motor neurons. While the exact lesion that causes RBD is unknown, evidence suggests that in both isolated and medication-induced REM sleep behavior disorder (RBD), the loss of REM sleep atonia is related to dysfunction of the subcoeruleus complex in the rostral lashonda [8,26].  In narcolepsy, orexin-mediated hypothalamic pathology is implicated in the failure to suppress REM sleep-related motor activity. In these cases, dysfunction in the neurotransmitter orexin can precipitate RBD [27]. Orexin, secreted from the lateral hypothalamus, promotes state (ie, wake, non-REM, REM) stability and prevents frequent transitioning between states. Orexin deficiency associated with narcolepsy leads to REM-wake instability, which promotes wake-like motor activity in parallel with REM dream mentation [28-30].  ETIOLOGY   Rapid eye movement (REM) sleep behavior disorder (RBD) is the clinical manifestation of a variety of central nervous system pathologies, all of which result in a failure to inhibit spinal motoneurons. These include alpha-synuclein and other forms of neurodegeneration, orexin deficiency (narcolepsy), structural pontine lesions, and toxic or possibly unmasking  effects from medications.  Alpha-synuclein neurodegeneration -- Alpha-synuclein pathology, as seen in Parkinson disease (PD), multiple system atrophy (MSA), and dementia with Lewy bodies (DLB), is the most common cause of RBD. In the absence of provoking medication or comorbid neurodegenerative disorder, it is the presumed cause of all cases of isolated RBD (previously referred to as idiopathic RBD) [31].  The pontine and medullary nuclei that control REM sleep are early targets in the natural history of synucleinopathies [8]. This pathology, which ultimately manifests in either PD, MSA, or DLB, thus initially presents to medical attention with vigorous dream-enactment behavior.  RBD confirmed by video polysomnography is among the strongest early predictors of PD [32,33]. Other nonmotor findings of PD, such as anosmia and constipation, often coincide with RBD and are manifestations of alpha-synuclein pathology in the olfactory bulb and enteric plexus [34-37]. However, symptoms are typically either dismissed as clinically insignificant or misattributed to some other etiology.  The interval between the onset of RBD and the parkinsonian triad of resting tremor, bradykinesia, and cogwheel rigidity may vary from months to decades. This process, when an individual at risk for disease begins to express the defining clinical characteristics of the disease, is referred to as phenoconversion. In the largest multicenter study of over 1000 patients with isolated RBD, the annual phenoconversion rate was 6 percent, with 74 percent phenoconverting within 12 years of RBD diagnosis [38]. Even among the rare cases of apparently persistent isolated RBD, neuroimaging biomarkers have demonstrated impending neurodegeneration [39], and postmortem examinations have revealed diffuse Lewy body pathology typical of alpha-synuclein degeneration [40]. (See 'Prognosis and counseling' below.)  Even prior to the onset of a neurodegenerative  syndrome, patients with RBD have evidence of diffuse and progressive neuropathology.  ?Neuroimaging studies such as dopamine transporter (PEDRO) imaging demonstrate coincident and progressive dopaminergic abnormalities in patients with RBD before motor symptoms arise [37,39,41-43].  ?Cholinergic denervation has also been reported in the brains of patients with RBD; similar to cholinergic impairment in PD, these findings are correlated with cognitive decline [44,45].  ?Abnormal clusters of alpha-synuclein can be identified by seed amplification assays (real-time quaking-induced conversion [RT-QuIC] or protein misfolding cyclic amplification [PMCA]) in a variety of tissues, including blood, cerebrospinal fluid, olfactory mucosa, and skin, in patients with isolated RBD as well as PD, DLB, and MSA [46]. Phosphorylated alpha-synuclein deposits can be also be detected directly in cutaneous nerve fibers by skin biopsy in patients with RBD [47-51]. (See 'Skin biopsy in prodromal phase' below.)  ?Increase in serum neurofilament light chain (NfL), a marker of neuro-axonal injury in the setting of neurodegenerative disease [52-54].  Non-synuclein neurologic disorders -- Less commonly, RBD occurs in non-synuclein neurodegenerative disorders such as progressive supranuclear palsy, frontotemporal dementia, amyotrophic lateral sclerosis, Alzheimer disease (AD), spinal cerebellar ataxia type 3, Adams disease, and myotonic dystrophy type 2 [8,55-58].  In these patients, it is uncertain whether the various neuropathologies that characterize these disorders also cause RBD. Some of the disorders affect pontine regions that control REM sleep, suggesting a unifying pathogenesis. However, some investigations suggest that the presence of RBD indicates comorbid alpha-synuclein pathology. This has been demonstrated in patients with cognitive impairment that was presumed to be due to Alzheimer pathology, in whom careful postmortem  "evaluations have demonstrated diffuse brain stem alpha-synuclein pathology [59].  Among patients with mild cognitive impairment (MCI) when a clinician is concerned about the possibility of impending AD, inquiring about dream enactment is useful. A straightforward question such as, "Have you ever been told that you act out your dreams (such as thrash, punch, or kick)?" helps distinguish patients who will ultimately go on to develop DLB (when answered in the affirmative) versus AD (when answered in the negative) [60]. Further, in distinguishing DLB from AD, the presence of RBD symptoms is even more specific than tremor, a parkinsonian sign [61].  With the exception of spinocerebellar ataxia type 3, these disorders are associated with a much lower prevalence of RBD compared with the alpha-synuclein disorders. In addition, symptoms of RBD in these cases typically arise after other neurologic deficits have manifested, rather than before [62].  RBD has also been described in association with paraneoplastic and autoimmune encephalitides such as IgLON5 encephalopathy, anti-leucine rich glioma inactivated 1 (LGI1) encephalitis, anti-contactin associated protein-like 2 (Caspr2) encephalitis, as well as Melvin disease and cerebellar degeneration [63-66]. (See "Autoimmune (including paraneoplastic) encephalitis: Clinical features and diagnosis".)  Post-traumatic stress disorder increases the odds of developing RBD in war veterans. This association has been noted even in the absence of comorbid traumatic brain injury [67]. (See "Sleep-wake disorders in patients with traumatic brain injury", section on 'Abnormal movements or behaviors during sleep'.)  Narcolepsy and state boundary control -- Approximately 50 percent of patients with narcolepsy have RBD. The association is greatest among patients with narcolepsy type 1 (narcolepsy with cataplexy), and the pathogenesis (orexin deficiency) is distinct from other forms of RBD. (See " "'Pathogenesis' above.)  In narcolepsy type 1, orexin deficiency results in an admixture of sleep and wake phenomena throughout a 24-hour period. This failure to separate wake from sleep results in classical clinical features of excessive daytime sleepiness, nocturnal sleep fragmentation, sleep paralysis, cataplexy, and hypnagogic and hypnopompic hallucinations. (See "Clinical features and diagnosis of narcolepsy in adults".)  The dream-enactment behavior of narcolepsy-related RBD is caused by a failure of orexin to stabilize REM sleep, with a resulting intrusion of wakeful muscle tone [28,30]. Alpha-synuclein biomarkers are absent in patients with narcolepsy-related RBD, suggesting that they do not have an increased risk of neurodegeneration [47].  Medications and substances associated with REM sleep behavior disorder (RBD)  Table 1  Medications -- Approximately half of all RBD patients develop dream enactment after initiating a serotonergic antidepressant medication, typically a selective serotonin reuptake inhibitor (SSRI), serotonin-norepinephrine reuptake inhibitor (SNRI), or serotonin modulating agent (table 1) [68,69]. Serotonergic RBD (5-HT RBD) appears to be the most prevalent form of RBD among the young (<40 years old) and, in contrast with isolated RBD, appears to occur as frequently in females as in males [24,68].  The magnitude of risk associated with SSRIs and SNRIs is not well characterized. In a retrospective study of over 10,000 polysomnography (PSG) exams, REM sleep without atonia (RWA), a PSG marker of RBD, was identified in 12 percent of participants who were taking an SSRI or SNRI versus 2 percent of the entire study population, representing a 10-fold increase in relative risk [70]. Only 7 of the patients with antidepressant-associated RWA carried a clinical diagnosis of RBD (0.5 percent). There was no difference in risk between SSRIs and SNRIs, and the study was not powered to distinguish " differential risk among specific agents.  Serotonergic raphe nuclei in the lashonda have an activating effect on the REM-off nuclei, suggesting a plausible pathological mechanism by which exogenous serotonergic agents could trigger dream enactment [8]. Additionally, 5-HT RBD may not represent a de sri induction of RBD, but instead may be revealing individuals predisposed to parkinsonian syndromes. This is supported by observations that patients with serotonergic RBD have other prodromal markers of alpha-synuclein neurodegeneration, such as anosmia, constipation, abnormalities in color vision, and subtle motor impairments [71]. These deficits are not well explained by serotonergic mechanisms and suggest that serotonergic antidepressants may unmask RBD in individuals already at risk for underlying neurodegeneration. This point is emphasized by a study of RBD participants in the [employer] Prodromal Synucleinopathy (NAPS) consortium, which found similar motor and cognitive scores among those individuals with 5-HT RBD compared with those with isolated RBD not due to an SSRI [72].  Other, less commonly reported medications that induce RBD include classes of agents also known to affect REM sleep. Emergent RBD has been reported in a patient with PD treated with suvorexant, a dual orexin receptor antagonist used for treatment of insomnia [73]. Other rare causes include beta blockers and cholinesterase inhibitors [5,74,75]. Withdrawal from alcohol, benzodiazepines, monoamine oxidase inhibitors, and barbiturates can precipitate RBD episodes as well (table 1).  CLINICAL FEATURES   Patients with rapid eye movement (REM) sleep behavior disorder (RBD) exhibit abnormal dream mentation and well as a wide spectrum of motor behaviors during REM sleep. Video polysomnography commonly demonstrates abnormal sustained or phasic muscle activity during REM sleep, as measured by chin or limb electromyography (EMG). In most cases, symptom  onset is gradual and progressive, with a delay of several years between the onset of symptoms and diagnosis.  Dream-enactment behaviors -- The defining symptom of RBD is repeated episodes of sleep-related vocalization and/or complex motor behaviors during REM sleep, correlating with dream mentation.  The movements of RBD are short in duration (less than 60 seconds) and appear purposeful, such as throwing a ball or flailing to protect oneself. They range in severity from benign hand gestures to violent thrashing, punching, and kicking. Sleep-related vocalizations may be loud and laden with expletives. Sleep-related injuries can arise from jumping out of bed or striking a bed partner. Events that involve leaving the bed or waking up on the floor pose the highest risk for self-injury.  In a cohort study of 203 consecutive patients with RBD followed at a tertiary care sleep clinic over a median of five years, just over half of patients were aware of their sleep behaviors [76]. The majority of patients had experienced at least one episode of punching (87 percent), kicking (82 percent), falling out of bed (77 percent; in most cases less than five times), gesturing (73 percent), or knocking over the nightstand (67 percent). Nearly all patients also reported vocalizations, most commonly talking (96 percent), screaming (90 percent), and moaning (64 percent). In both males and females, approximately 60 percent of patients and 20 percent of bed partners had sustained injury.  Symptoms predominantly occur in the second half of the sleep period, when REM sleep is most prevalent. The frequency of events ranges from nightly to annually [5,77].  Patients often sleep through mild events. With more vigorous dream enactment, they typically wake up for a brief period of time and then fall back asleep. Dream content is often recalled at the time of awakening and is typically unpleasant (eg, dreaming of being attacked or chased, arguing  with someone, or falling off a bertrand) [76,78]. Patients may appear briefly confused when they first awaken but quickly orient to their surroundings.  Dream content in patients with RBD is not more violent than in normal individuals, despite the violent behaviors. Patients are no more likely than controls to exhibit daytime violence or personality disturbances [79,80].  Among narcolepsy patients with RBD, dream enactment tends to occur earlier in the sleep period and is composed of more simple movements that are less violent.  Clinical course -- Symptoms of RBD typically begin in late adulthood. The median age of diagnosis is 60 to 70 years [81-84]. In most cases, symptom onset is gradual and progressive, with a delay of several years between the onset of symptoms and diagnosis. Patients typically present to medical attention with a concern related to injurious or potentially injurious actions to themselves and/or a bed partner.  Neurologic findings -- Patients with isolated RBD often have subtle, progressive motor and cognitive features consistent with early neurodegeneration. Typical findings include mild postural instability and gait abnormalities, including freezing of gait, consistent with subtle parkinsonism. Quantitative motor testing reveals dysarthria and limb bradykinesia imperceptible on a clinical exam [85,86].  Cognitive impairments are similar to those seen in patients with Parkinson disease (PD) and dementia with Lewy bodies, with progressive deficits in visuoconstructional skills, facial expression recognition, color identification, and executive function [8,86-90]. Many patients will describe pareidolia (the tendency to interpret a vague visual feature as familiar, such as a face in the sand), a finding that correlates with underlying occipital dysfunction consistent with impending Lewy body disease [91].  Comorbid olfactory dysfunction, constipation, and orthostatic hypotension are frequently noted  "in cases of isolated RBD [35,86,92-94]. Like the motor and cognitive deficits, these impairments are similar to those seen in patients with PD [37]. (See "Clinical manifestations of Parkinson disease".)  Among patients with PD, those with RBD suffer a greater clinical burden with more rapid cognitive impairment, more psychiatric comorbidities, poorer treatment response, and more widespread brain atrophy compared with PD patients without RBD [95-99]. RBD appears to specifically predict more freezing of gait, and many of the same brainstem regions implicated in the pathophysiology of RBD mediate the pathogenesis of freezing of gait [100].  DIAGNOSIS   A diagnosis of rapid eye movement (REM) sleep behavior disorder (RBD) should be suspected in patients with a clinical history of recurrent dream-enactment behavior and confirmed with video polysomnography [5,101].  Evaluation -- The clinical evaluation should include a detailed review of the sleep-wake complaints, a neuropsychiatric history, and complete physical and neurologic examination. A report from a bed partner is particularly helpful, as many patients are unable to properly recall the sleep-related events.  RBD can often be detected with one question asked of the bed partner: "Have you ever seen the patient appear to 'act out their dreams' (punched or flailed arms in the air, shouted, or screamed) while sleeping?" [62]. Importantly, such screening questions are fairly sensitive for RBD but they are not specific, as other sleep-disrupting conditions, such as obstructive sleep apnea, periodic limb movements, confusional arousals, and nocturnal epilepsy, can cause similar movements [102].  The history should also pay specific attention to the timing of abnormal vocalizations or behaviors during the night. This feature helps distinguish RBD from other parasomnias such as confusional arousals, sleepwalking, and sleep terrors, as RBD is more likely to occur in the " "second half of the sleep period (figure 2). (See 'Differential diagnosis' below.)  Patients should be asked about potentially offending medications (eg, antidepressants (table 1)) as well as ancillary symptoms of alpha-synuclein neurodegenerative disorders, such as difficulty with smell, syncope, bowel motility, visual hallucinations, and tremor. When chronic, unexplained anosmia, orthostasis, and constipation coexist with RBD, they are highly suggestive of an imminent alpha-synuclein disorder such as Parkinson disease (PD) [8]. In young patients, symptoms of RBD should prompt consideration of comorbid narcolepsy.  In-laboratory video polysomnography is necessary for a definitive diagnosis of RBD. Even when abnormal behavior does not occur during the study, REM sleep without atonia (RWA) is characteristically present and required for the diagnosis. Polysomnography is also helpful in excluding other sleep-disrupting conditions (eg, obstructive sleep apnea, nocturnal seizures, periodic limb movements) [5,103]. Home sleep apnea testing is not a useful test for RBD. (See 'Video polysomnography' below.)  Additional testing such as neuroimaging, electroencephalography, and neuropsychological batteries are warranted only if there is further evidence suggesting a neurodegenerative disorder [104]. Tests that identify central nervous system dopamine dysfunction, such as dopamine transporter (PEDRO) imaging, or other markers of synuclein pathology, such as metaiodobenzylguanidine (MIBG, iobenguane [id number]) cardiac scintigraphy, are not necessary for the diagnosis of RBD, but can be clinically useful in predicting and monitoring the course of the disease [8,105,106]. (See "Diagnosis and differential diagnosis of Parkinson disease" and "Clinical features and diagnosis of dementia with Lewy bodies" and "Multiple system atrophy: Clinical features and diagnosis".)  Video polysomnography -- Video polysomnography is necessary for " "definitive diagnosis of RBD and to exclude other sleep disorders that can mimic RBD.  Rapid eye movement sleep behavior disorder on polysomnography  Figure 3  The characteristic polysomnographic finding of RBD is RWA, which is an elevation of motor tone during REM sleep as measured by electromyography (EMG) in the chin and/or limb leads (figure 3) [103,107,108]. Formal polysomnographic criteria for RWA developed by the [employer] and others are reviewed separately. (See "Polysomnography in the evaluation of parasomnias and epilepsy", section on 'REM sleep behavior disorder'.)  When present, RWA tends to occur during every REM cycle but is most prominent in the final REM period of the night. While there is little correlation between the severity of polysomnographic findings and clinical symptoms, significant progression of RWA over time may correlate with progression to RBD in subjects with isolated RWA [109,110]. Dramatic behaviors are rarely captured during polysomnography; by contrast, more subtle, seemingly purposeful hand movements suggestive of dream enactment, referred to as REM sleep behavior events (RBEs), are often observed.  RWA is occasionally detected in individuals without a reported history of dream-enactment behaviors, particularly in patients taking serotonergic antidepressants [70,111,112] and in older adults [113]. The reported prevalence of isolated RWA in adults without a history of RBD ranges from 10 to 15 percent and varies according to how RWA is defined [4,70,113-116]. The proportion of patients with isolated RWA who later develop RBD has not been established; in one small study, 1 of 14 individuals with RWA progressed to RBD over a mean of 8.6 years [109]. Video PSG criteria for prodromal RBD have been proposed [108]. (See "Polysomnography in the evaluation of parasomnias and epilepsy", section on 'REM sleep behavior disorder'.)  In patients with comorbid narcolepsy, " "polysomnography may also demonstrate frequent shifts between REM and non-REM sleep and consistent sleep state boundary dysfunction [28]. (See "Clinical features and diagnosis of narcolepsy in adults".)  Diagnostic criteria -- According the International Classification of Sleep Disorders, third edition, text revision (ICSD-3-TR), a diagnosis of RBD requires all of the following [5]:  ?Repeated episodes of sleep-related vocalization and/or complex motor behaviors  ?Behaviors are documented by polysomnography to occur during REM sleep or, based on clinical history of dream enactment, are presumed to occur during REM sleep  ?Presence of RWA on polysomnography (see 'Video polysomnography' above)  ?The sleep disturbance is not better explained by another current sleep disorder or mental disorder  DIFFERENTIAL DIAGNOSIS   The differential diagnosis of rapid eye movement (REM) sleep behavior disorder (RBD) includes disorders of arousal, other REM-associated parasomnias such as nightmares, sleep-disrupting conditions such as periodic limb movement disorder and obstructive sleep apnea, nocturnal seizures, and dissociative psychiatric disorders. Although these disorders may sometimes be distinguished from each other by history, overnight polysomnography is required for definitive diagnosis.  ?Non-REM parasomnias - The most common disorders to be distinguished from RBD are the non-REM (NREM) parasomnias: confusional arousals, sleepwalking, and sleep terrors. Unlike RBD, non-REM parasomnias usually present in childhood, although sleepwalking in adulthood may, like RBD, be associated with higher odds of developing Parkinson disease [117]. Aspects of the history that are helpful in distinguishing NREM parasomnias from RBD include:  Duration and timing of the events - RBD consists of brief dream enactment (<60 seconds) occurring in the latter half of the sleep period, followed by alertness and orientation upon awakening. This " "presentation contrasts with sleepwalking, in which there is often a lifelong history of prolonged, amnestic, complex, nonviolent activities emanating from the first half of the sleep period. Similarly, confusional arousals are more prolonged (>60 seconds) and more often occur in the first half of the night (figure 2). (See "Disorders of arousal from non-rapid eye movement sleep in adults", section on 'Confusional arousals'.)  Response upon awakening - Sleepwalking subjects are difficult to awaken and only rarely report dream mentation. By contrast, patients with RBD often recall dream content if awakened and are alert and oriented afterwards.  Nature of vocalizations - In RBD, sleep-related vocalizations may be loud and laden with expletives. This contrasts with normal sleep talking, which is more typical of daytime conversation and occurs during both NREM and REM sleep [105]. Sleep terrors may be accompanied by a loud vocalization but have other features distinct from RBD. They are mostly limited to preadolescence and characterized by amnestic episodes of intense fear initiated by a sudden scream. They may last several minutes, during which time the patient is inconsolable. Patients are amnestic to the events, while parents or other caregivers often find them frightening [105]. (See "Parasomnias of childhood, including sleepwalking", section on 'Clinical features'.)  ?Parasomnia overlap disorder - Parasomnia overlap disorder is characterized by RBD and either a disorder of arousal, sleep-related eating disorder, sexsomnia, or rhythmic movement disorder [5]. Compared with RBD, parasomnia overlap disorder has an earlier age of onset (often in childhood or adolescence) and may be comorbid with a variety of neurologic and psychiatric disorders such as narcolepsy and multiple sclerosis. (See "Disorders of arousal from non-rapid eye movement sleep in adults", section on 'Parasomnia overlap disorder'.)  ?Nightmares - " "Nightmares are REM-related phenomena consisting of disturbing mentation and recalled in vivid detail. Unlike RBD, nightmares are not associated with motor activity or sleep-related injury [105]. In fact, nightmares are often characterized by sleep paralysis, an inability to move, defend oneself, or scream. (See "Nightmares and nightmare disorder in adults".)  ?Obstructive sleep apnea - Behaviors that may mimic RBD can occur when REM sleep is fragmented by obstructive sleep apnea. However, these parasomnia-like behaviors resolve once the sleep-disordered breathing is effectively treated [8,104]. This phenomenon has been referred to as pseudo-RBD.  ?Periodic limb movements - Periodic limb movements (PLMs) are lower extremity "triple flexion responses" (dorsiflexion of the ankle, flexion of the knee and hip) along with dorsiflexion of the toe, akin to the Babinski response. Unlike RBD, PLMs occur primarily during NREM sleep, are periodic (approximately every 45 seconds), and are unrelated to dream mentation [5,107]. Rare cases of frequent and vigorous periodic limb movements during both NREM and REM sleep, mimicking RBD, have been described [61]. These pseudo-RBD patients often respond to therapies, such as dopaminergic agents, which are frequently used in the treatment of PLMs. (See 'Management' below and "Polysomnography in the evaluation of abnormal movements during sleep", section on 'Periodic limb movements of sleep'.)  ?Sleep-related hypermotor epilepsy - RBD is occasionally confused with sleep-related hypermotor epilepsy (previously called nocturnal frontal lobe epilepsy). This syndrome, which can be familial or sporadic, is characterized by stereotyped, recurrent (up to 20 episodes a night), abnormal behaviors. Electroencephalography may (but not universally) reveal epileptic activity. Compared with RBD, patients with sleep-related hypermotor epilepsy are younger (typically presenting in adolescence) and fully " "unaware of nighttime behaviors [104]. (See "Sleep-related epilepsy syndromes", section on 'Sleep-related focal epilepsies'.)  MANAGEMENT   Management of rapid eye movement sleep behavior disorder  Algorithm 1  Establishing a safe sleeping environment is the primary goal of treatment. This can be achieved through modification of the sleep environment and, if necessary, pharmacotherapy (algorithm 1).  In the absence of large clinical trials, recommendations are based primarily on observational studies and accumulated clinical experience in concerned patients at risk for life-threatening injury [105,118,119]. Although small controlled trials have failed to show consistent improvement with pharmacotherapy, the authors' experience is that both melatonin and clonazepam are effective in suppressing rapid eye movement (REM) sleep behavior disorder (RBD) behaviors in the majority of patients, and that melatonin may be better tolerated, particularly among older adults with neurodegenerative disorders (table 2). Recommendations below are generally consistent with the clinical practice guideline from the American Academy [employer] [120,121].  Safe sleeping environment -- The frequency of dream enactment behaviors is not predictive of injury, so all patients with RBD and their bed partners should be counseled on modifying the sleeping environment to prevent injury. For patients with mild symptoms, this may be all that is needed.  The punching, kicking, and jumping behaviors can result in hematomas, fractures, lacerations, and joint dislocations [122]. Bed partners are frequently the target of violent dream enactment, and RBD has rarely been implicated in charges of domestic assault [123,124].  Firearms should not be accessible, and sharp or easily breakable furniture and objects (such as lamps) should be removed from the immediate sleeping area. In the event of continued vigorous behaviors, sleeping alone is advised. " Many patients resort to using padded bed rails or sleeping in a sleeping bag [105].  Exiting the bed while acting out a dream is a high-risk behavior that may result in traumatic injury [123]. A bed alarm that delivers a customized calming message at the onset of dream enactment can prevent a patient from exiting the bed and avert sleep-related injury [125].  Reversible factors -- Medications known to exacerbate RBD, including the serotoninergic antidepressants, should be discontinued or avoided if possible in patients with RBD (table 1) [126]. Many cases of medication-associated dream enactment are self-limited following discontinuation of the offending medication.  In patients with a sleep-fragmenting condition such as obstructive sleep apnea, dream-enactment behavior often resolves when the underlying disorder is treated [8,105]. The duration and circadian timing of sleep should be optimized as well.  Pharmacotherapy -- All patients with frequent, disruptive, or injurious behaviors should be treated with pharmacotherapy to reduce behaviors and lower risk of injury.  Pharmacologic treatment of REM sleep behavior disorder in adults  Table 2  Melatonin (preferred) -- Melatonin is our preferred first-line therapy in patients with frequent, disruptive or injurious behaviors (algorithm 1 and table 2). It tends to be better tolerated than the alternative first-line therapy, clonazepam, especially in older adults with neurodegenerative disorders [120,127].  Melatonin is an endogenous hormone normally secreted by the pineal gland in response to evening darkness, entraining circadian rhythms. Studies in patients with RBD confirm alterations in circadian rhythms [128,129]. Melatonin in high doses at bedtime (6 to 18 mg) augments REM sleep atonia and improves RBD symptoms [127,130,131]. In addition to suppressing REM motor tone, melatonin renormalizes other circadian features of sleep, and these improvements persist for  several days after melatonin is discontinued but then gradually re-emerge [129].  In several observational studies and one small, randomized trial, the majority of patients treated with melatonin experienced at least partial improvement in the frequency and severity of RBD symptoms and a reduced likelihood of injury [127,130-134]. In a retrospective study that included 45 patients with RBD, melatonin and clonazepam were similarly effective, and melatonin was better tolerated [134]. Approximately two-thirds of patients treated with melatonin reported at least mild improvement in symptoms, and 12 percent had complete resolution of RBD behaviors. Patients on melatonin reported fewer falls and injuries post-treatment compared with clonazepam.  The dose of melatonin required to suppress behaviors in patients with RBD varies. In our experience, most patients achieve significant improvement with doses ranging from 6 to 18 mg nightly. We typically start with 3 mg at night and then increase in 3 mg increments until the disruptive and injurious behaviors have ceased (table 2). Use of time-release melatonin has a theoretical but unproven advantage over immediate release formulations. For time-release melatonin, a suggested starting dose is 5 mg, titrating by 5 mg every one to two weeks to a maximum of 15 mg nightly.  Melatonin tends to be well tolerated at these doses, with occasional patients developing gastrointestinal distress or headache that is dose limiting. Other reported side effects are usually mild and include sleepiness, fatigue, dizziness, unsteadiness, and cognitive alteration [127]. Melatonin is not regulated by the [place] Food and Drug Administration and is available in a variety of formulations over the counter. Unlicensed, nonprescription products can vary widely due to differences in the type of preparation and additives used. In our experience, most formulations appear to be clinically equivalent. Melatonin  undergoes hepatic metabolism and should be used with caution in patients with hepatic impairment.  Behaviors typically return when melatonin is reduced or discontinued, and most patients require lifelong therapy. If behaviors are inadequately suppressed with melatonin, low-dose clonazepam is an effective add-on or alternative therapy.  Clonazepam -- Low-dose clonazepam (starting at 0.25 to 0.5 mg orally at bedtime) has long been recognized as a treatment for RBD [120]. The usual effective dose range for RBD is 0.5 to 1 mg nightly. The therapeutic mechanisms of clonazepam in RBD are not fully understood, although it is thought that clonazepam may reduce the frequency of unpleasant dreams, thus decreasing violent dream enactment behavior [135].  In four large case series, clonazepam was associated with complete resolution of RBD symptoms in 55 to 79 percent of patients and partial reduction in an additional 11 to 32 percent [76,81,82,136]. It was reassuring that clonazepam therapy had sustained efficacy, and without significant dose escalation, over years of nightly use [136]. However, follow-up studies describe more mixed results, ranging from sustained benefit without dose escalation to a high incidence of increasing dose requirements and ultimately treatment failure [132,135,137-141]. In a head-to-head trial between clonazepam and prolonged-release melatonin, clonazepam demonstrated superior efficacy for RBD symptoms but with more significant side effects [142].  Although low doses of clonazepam (0.5 to 1 mg at bedtime) are typically sufficient to suppress RBD behaviors, side effects can limit its utility. In a cohort study that included 167 patients treated with clonazepam (mean effective dose 1 mg), 39 percent of patients reported side effects, most commonly morning sedation and dizziness, leading to drug discontinuation in 9 percent [76].  Side effects can be particularly problematic among older adults and in  the setting of advanced neurodegenerative disease, where its prolonged duration of action may result in morning sedation as well as gait and cognitive impairment [125,143]. In such patients, we suggest a lower initial dose (eg, 0.125 or 0.25 mg) and close monitoring for the emergence of toxicity (table 2). Patients who do require long-term therapy can be reassured by a study indicating that clonazepam does not appear to permanently impair cognition [144]. Clonazepam undergoes hepatic metabolism and should be used with caution in patients with hepatic impairment.  Cholinergic agents -- Acetylcholinesterase inhibitors such as donepezil and rivastigmine appear to decrease dream enactment in patients with RBD, even in the absence of cognitive impairment or cognitive symptoms. These agents are a reasonable dual-purpose option for patients with RBD who also have mild cognitive impairment, Parkinson disease (PD), or dementia with Lewy bodies (DLB) [120].  In one placebo-controlled, crossover trial, rivastigmine reduced the number of dream-enactment behavior episodes (as noted by bed partners) in patients with PD and RBD [145]. Rivastigmine is administered by transdermal patch, and dosing typically starts at 4.6 mg applied every 24 hours. The dose can be titrated up to a maximum of 13.3 mg daily. Donepezil, another cholinesterase inhibitor, has also been reported to improve RBD symptoms in three patients [146].  Others -- While dopaminergic medications (eg, levodopa, pramipexole, ropinirole) are the standard therapy for daytime motor symptoms of PD, they are rarely effective for RBD alone.  Pramipexole has been reported to effectively treat RBD when there are frequent PLMs during non-REM sleep. In these cases, it is possible that pramipexole appears effective because it is helping to pacify nocturnal motor activity and by addressing an underlying sleep fragmenting condition, PLM disorder (PLMD) [147-149]. Thus, we recommend  avoiding pramipexole for the treatment of RBD unless frequently ancillary, dopaminergic responsive motor activity is present on PSG.  Other agents with some reported success include imipramine, carbamazepine, sodium oxybate, triazolam, zopiclone (available outside the [place]), quetiapine, and clozapine [118,132,138,150,151].  Surgeries for PD do not improve RBD symptoms or the polysomnographic findings of RBD. In four case series of patients with PD, deep brain stimulation of the subthalamic nuclei was associated with improvement in subjective sleep quality and sleep architecture on polysomnography; however, there was little to no improvement in dream-enactment behavior or REM atonia [152-155].  PROGNOSIS AND COUNSELING   Risk of neurodegenerative disease -- Most patients with isolated RBD eventually develop an alpha-synuclein neurodegenerative disorder with a phenotype of Parkinson disease (PD), dementia with Lewy bodies (DLB), or multiple system atrophy (MSA) [122,156-158]. While the interval between the onset of dream enactment and diagnosis of PD or a related disorder can vary from months to decades, the conversion rate is approximately 6 percent every year and approximately 75 percent at 12 years from RBD diagnosis [38,159].  Risk factors for conversion to an alpha-synucleinopathy are well studied [159], although an individualized risk assessment remains difficult. No single biomarker for phenoconversion in people with RBD fulfills the ideals of precision, accuracy, availability, and cost effectiveness [31]. Some biomarkers may appear early and change very slowly over time (eg, olfaction, color discrimination), while others may appear closer to phenoconversion (eg, motor signs, cognitive impairment). In a multicenter prospective cohort study of 1280 patients with isolated RBD and a mean follow up of 4.6 years, the strongest risk factors for conversion to a neurodegenerative disorder were abnormal  "quantitative motor testing (hazard ratio [HR] 3.2), objective motor exam findings (HR 3.0), olfactory dysfunction (HR 2.6), erectile dysfunction in males (HR 2.1), abnormal color vision (HR 1.7), and constipation (HR 1.7) [38]. The presence of attentional or visuospatial dysfunction has been associated with earlier development of a dementia phenotype, as opposed to a motor phenotype [160].  Two proposed staging systems for neuronal alpha-synuclein disorders are intended to provide biomarker-established stages for clinical trials of neuroprotective strategies and may eventually be validated and used clinically [161,162]. These staging systems integrate a clinical prodrome, such as RBD, with a tissue diagnosis of synuclein pathology and a measure of dopaminergic dysfunction. Depending upon the staging system, the tissue diagnosis can be made by seed amplification assays or immunohistochemical detection of alpha-synuclein pathology on cerebrospinal fluid (CSF) or skin biopsy. Dopamine neuronal dysfunction can be identified by imaging such as dopamine transporter (PEDRO) imaging. (See "Diagnosis and differential diagnosis of Parkinson disease", section on 'Biological definition and staging system'.)  Skin biopsy in prodromal phase -- Prior to the diagnosis of DLB, PD, or MSA, synuclein pathology has typically spread throughout the peripheral nervous system, where abnormal phosphorylated alpha-synuclein protein aggregates accumulate in cutaneous nerves [51]. Skin biopsy can provide direct histopathological confirmation of underlying neurodegeneration in patients with isolated RBD [47,49]. A commercially available diagnostic tool for identifying synuclein pathology in cutaneous nerves has been developed [51], which may be an ideal test to screen for evidence of prodromal disease among patients interested in this information for the purposes of prognosis and clinical trial participation. (See 'Prognostic disclosure' " "below.)  The presence of cutaneous phosphorylated alpha-synuclein is close to 100 percent specific for a systemic synucleinopathy [47-51]. In cross-sectional studies, positivity rates in people with isolated RBD range from approximately 60 to 90 percent, in contrast with 0 percent in healthy controls and people with narcolepsy-related RBD or Alzheimer disease [31]. Importantly, in isolated RBD, cutaneous deposits are frequently identified in the absence of other biomarkers, such as Lewy body-type cognitive dysfunction or clinical parkinsonian motor deficits [47-51].  Prognostic disclosure -- A discussion about the neurodegenerative risks associated with a diagnosis of RBD is usually appropriate but must be balanced by uncertainties, especially with regard to patient-specific risk, and by patient preferences with regard to disclosure [163-165]. Clinicians should bear in mind that a diagnosis of RBD is not a guarantee of future neurodegenerative disease, but rather suggests an increased susceptibility. Risk estimates are derived from selected populations and do not equate to definitive information on a patient's individual risk.  Clinicians may reasonably worry about causing excess anxiety by disclosing a possible neurodegenerative condition to middle-aged and older patients with RBD. Conversely, withholding risk information may harm the provider-patient relationship, as patients and caregivers are likely to discover the association through an internet search. Some experts suggest only disclosing information to patients who desire the information. For example, the clinician can first ask, "Would you be willing to know more about the links between RBD and neurologic conditions?" [166]. This conservative approach on disclosure is reasonable; however, a clinician should readdress the topic at subsequent follow-up visits and inquire whether interest in learning more has changed.  More research in this area is needed. In a " study of over 100 patients with established PD who were surveyed, patients were asked to consider whether they would have wanted to know about their risk of PD years before the diagnosis [167]. Responses varied significantly based on certain qualifiers: only 46 percent said they would have wanted to know if there were no medical therapies available, while 85 percent said they would have wanted to know if they were given instructions on how lifestyle changes may alter the course of disease. In patients with established RBD, several studies have found that a large majority (>90 percent) indicate a preference for risk disclosure, citing future planning and the opportunity to be involved in research [166,168]. The generalizability of these studies may be limited, however, since surveyed patients were already involved in research on RBD. Studies in more diverse samples of patients are needed.  It can be useful to share general information first and gauge receptivity and level of distress before disclosing more specific detail. Communication should be tailored to the individual patient and take into account age, comorbidities, examination findings, caregiver support, medical literacy, and sociocultural background. Another approach is to provide information about neurodegenerative risk in RBD to the primary care clinician, who may have a longstanding relationship with the patient and have more knowledge of their values and preferences.  Disclosure can take place gradually, over several visits. During this time, patients should be monitored for early signs of neurodegeneration that may further inform risk. When risk is discussed, it is important to provide balanced information that includes actionable steps that may be taken for those who wish to make lifestyle changes.  Discussing the association openly with patients, families, and caregivers has several potential benefits. It provides patients with the opportunity to ask  "questions and avoids the potential for misinformation obtained on the internet. Patients can be counseled that PD and other disorders are treatable conditions. In the event that effective neuroprotective therapies for alpha-synuclein neurodegeneration are identified, patients with RBD could benefit from early treatment. As an example, growing evidence suggests that intermittent intense aerobic exercise may decrease PD risk [169]. Considering the cardiovascular benefits of aerobic exercise, we encourage all our patients with RBD to maintain an active lifestyle with at least 30 minutes of intense aerobic exercise three to four times per week.  As a biophysiological marker of alpha-synuclein neurodegeneration, RBD provides a unique opportunity to potentially identify neuroprotective therapies to impede or halt PD. A consortium of multinational investigators, the International Rapid Eye Movement Sleep Behavior Disorder Study Group, meets annually to promote the development of collaborative clinical trials [170]. For interested patients, the [employer] American Prodromal Synucleinopathy (NAPS) Consortium is recruiting patients with RBD for trials of potential disease-modifying therapies.  SOCIETY GUIDELINE LINKS   Links to society and government-sponsored guidelines from selected countries and regions around the world are provided separately. (See "Society guideline links: Parasomnias, hypersomnias, and circadian rhythm disorders".)  INFORMATION FOR PATIENTS   [facility] offers two types of patient education materials, "The Basics" and "Beyond the Basics." The Basics patient education pieces are written in plain language, at the 5th to 6th grade reading level, and they answer the four or five key questions a patient might have about a given condition. These articles are best for patients who want a general overview and who prefer short, easy-to-read materials. Beyond the Basics patient education pieces are longer, more " "sophisticated, and more detailed. These articles are written at the 10th to 12th grade reading level and are best for patients who want in-depth information and are comfortable with some medical jargon.  Here are the patient education articles that are relevant to this topic. We encourage you to print or e-mail these topics to your patients. (You can also locate patient education articles on a variety of subjects by searching on "patient info" and the keyword(s) of interest.)  ?Basics topics (see "Patient education: Rapid eye movement (REM) sleep behavior disorder (The Basics)")  Patients and caregivers can also be referred to the NAPS Consortium website for educational material and information on research opportunities.  SUMMARY AND RECOMMENDATIONS   ?Definition - Rapid eye movement (REM) sleep behavior disorder (RBD) is a parasomnia characterized by dream enactment that emerges after a loss of REM sleep atonia. Behaviors are brief, correlate with dream mentation, mainly occur in the second half of the night, and, when violent, can result in injury to the patient or bed partner. (See 'Dream-enactment behaviors' above.)  ?Prevalence - The prevalence of RBD is estimated at 0.5 to 1.25 percent in the general population, with higher frequencies among older adults and those with Parkinson disease (PD), multiple system atrophy, and dementia with Lewy bodies. (See 'Epidemiology' above.)  ?Etiology - Most cases of RBD are caused by alpha-synuclein neurodegeneration. RBD is also caused by antidepressant medications (table 1), narcolepsy, and pontine lesions such as those from stroke or multiple sclerosis. (See 'Etiology' above.)  ?Diagnosis - The diagnosis of RBD is based upon a history of dream-enactment behavior and REM sleep without atonia (RWA), as documented by polysomnography. (See 'Diagnosis' above.)  ?Differential diagnosis - The differential diagnosis of RBD includes non-REM parasomnias (confusional arousals, " sleepwalking, sleep terrors); nightmares; benign sleeptalking; nocturnal frontal lobe epilepsy; as well as sleep-fragmenting conditions such as obstructive sleep apnea and periodic limb movements. (See 'Differential diagnosis' above.)  ?Management (algorithm 1)  Establish safe sleeping environment - All patients with RBD and their bed partners should be counseled on ways to alter the sleeping environment to prevent injury. (See 'Safe sleeping environment' above.)  Reversible factors - Medications known to exacerbate RBD, including the serotoninergic antidepressants (table 1), should be discontinued or avoided if possible. Sleep disorders such as obstructive sleep apnea should be diagnosed and treated. (See 'Reversible factors' above.)  Pharmacotherapy - For patients with frequent, disruptive or injurious behaviors, we recommend pharmacotherapy (Grade 1B). We suggest melatonin as initial therapy rather than clonazepam (Grade 2C).  A typical starting dose of melatonin for RBD is 3 mg at bedtime, increased in 3 mg increments until behaviors subside (table 2). Most patients find relief with doses between 6 and 18 mg. (See 'Melatonin (preferred)' above.)  Low-dose clonazepam (0.25 to 1 mg) is an effective alternative or add-on therapy but may be less well tolerated, particularly in patients with comorbid neurodegenerative disease. (See 'Clonazepam' above.)  ?Prognosis and counseling - Most patients with isolated RBD eventually develop either PD or another disorder of alpha-synuclein neurodegeneration. The RBD to PD conversion rate is approximately 50 percent every decade. The presence of anosmia, constipation, and orthostasis increases the likelihood of earlier conversion. (See 'Prognosis and counseling' above.)

## 2025-06-16 NOTE — PROGRESS NOTES
Subjective     Patient ID: Pito Garcia is a 70 y.o. male.    Chief Complaint: REM sleep behavior disorder    HPI  On clonazepam 1mg/day  Working well  Needs RFs    Saw Medina re: neck  Did not recommend surgery  Interested in [facility]; will discuss w/ his wife's pain med md (allan)    Review of Systems  A 14pt ROS was reviewed & is negative unless otherwise documented in the HPI       Objective     Physical Exam  GENERAL: NAD, calm, cooperative, appropriate  Awake/alert  Well groomed  RESP: CTAB  HEART: RRR  No LE edema  MENTAL STATUS: oriented, follow commands reliably  SPEECH/LANGUAGE: clear, fluent  CN:  Perrla, eomi, vff, gaze conjugate  No tactile or motor facial asymmetry  Tongue protrudes midline  Motor: no focal weakness  Cerebellar: no tremor or dysmetria  Sensory: normal to tactile stim/vibration  DTRs: normal +2, symmetric  Gait: steady       Assessment and Plan     1. REM sleep behavior disorder  -     Discontinue: clonazePAM (KLONOPIN) 1 MG tablet; Take 1 tablet (1 mg total) by mouth every evening.  Dispense: 90 tablet; Refill: 1  -     clonazePAM (KLONOPIN) 1 MG tablet; Take 1 tablet (1 mg total) by mouth every evening.  Dispense: 90 tablet; Refill: 1    Cont clonazepam 1mg/day - printed, having escribe issues, so rxs printed  F/u 6mo    Miguelina Connolly [facility]     Follow up in about 6 months (around 12/16/2025) for REMBD.

## 2025-08-08 DIAGNOSIS — E78.5 DYSLIPIDEMIA: Primary | Chronic | ICD-10-CM

## 2025-08-08 RX ORDER — ATORVASTATIN CALCIUM 40 MG/1
40 TABLET, FILM COATED ORAL DAILY
Qty: 90 TABLET | Refills: 3 | Status: SHIPPED | OUTPATIENT
Start: 2025-08-08 | End: 2026-08-08